# Patient Record
Sex: FEMALE | Race: WHITE | NOT HISPANIC OR LATINO | Employment: PART TIME | URBAN - METROPOLITAN AREA
[De-identification: names, ages, dates, MRNs, and addresses within clinical notes are randomized per-mention and may not be internally consistent; named-entity substitution may affect disease eponyms.]

---

## 2018-03-07 ENCOUNTER — HOSPITAL ENCOUNTER (OUTPATIENT)
Dept: GENERAL RADIOLOGY | Facility: HOSPITAL | Age: 53
Discharge: HOME | End: 2018-03-07
Attending: NURSE PRACTITIONER | Admitting: NURSE PRACTITIONER

## 2019-12-04 RX ORDER — OSELTAMIVIR PHOSPHATE 75 MG/1
75 CAPSULE ORAL DAILY
Qty: 10 CAPSULE | Refills: 0 | Status: SHIPPED | OUTPATIENT
Start: 2019-12-04 | End: 2020-02-26

## 2020-01-08 ENCOUNTER — TELEPHONE (OUTPATIENT)
Dept: FAMILY MEDICINE CLINIC | Facility: CLINIC | Age: 55
End: 2020-01-08

## 2020-01-08 DIAGNOSIS — M85.89 OSTEOPENIA OF MULTIPLE SITES: ICD-10-CM

## 2020-01-08 DIAGNOSIS — E78.2 MIXED HYPERLIPIDEMIA: Primary | ICD-10-CM

## 2020-01-08 DIAGNOSIS — E07.9 THYROID DISORDER: ICD-10-CM

## 2020-01-08 DIAGNOSIS — Z13.220 SCREENING, LIPID: ICD-10-CM

## 2020-01-08 DIAGNOSIS — R31.1 BENIGN ESSENTIAL MICROSCOPIC HEMATURIA: ICD-10-CM

## 2020-01-08 DIAGNOSIS — N13.30 HYDRONEPHROSIS, UNSPECIFIED HYDRONEPHROSIS TYPE: ICD-10-CM

## 2020-01-08 PROBLEM — I82.409 DEEP VEIN THROMBOSIS (DVT) (HCC): Status: ACTIVE | Noted: 2020-01-08

## 2020-01-08 PROBLEM — E78.5 HYPERLIPIDEMIA: Status: ACTIVE | Noted: 2020-01-08

## 2020-01-08 PROBLEM — M85.80 OSTEOPENIA: Status: ACTIVE | Noted: 2020-01-08

## 2020-01-08 PROBLEM — N92.0 MENORRHAGIA: Status: ACTIVE | Noted: 2018-03-02

## 2020-01-08 RX ORDER — BIOTIN 5 MG
TABLET ORAL
COMMUNITY
End: 2021-06-04

## 2020-01-08 RX ORDER — FLUTICASONE PROPIONATE 50 MCG
SPRAY, SUSPENSION (ML) NASAL
COMMUNITY
Start: 2016-04-15 | End: 2020-02-26

## 2020-01-08 RX ORDER — MULTIVITAMIN
CAPSULE ORAL
COMMUNITY
Start: 2014-05-08 | End: 2021-10-15

## 2020-01-08 RX ORDER — CETIRIZINE HYDROCHLORIDE 10 MG/1
TABLET ORAL
COMMUNITY
Start: 2016-04-15 | End: 2020-02-26

## 2020-01-08 NOTE — TELEPHONE ENCOUNTER
Patient has appointment with you on 02/26/2020 for her physical and she is requesting labs prior to her appointment as well as an A1C added to the labs. However she is not diabetic or prediabetic. Glucose was in normal range on 03/07/2018.

## 2020-01-21 LAB
1,25(OH)2D3 SERPL-MCNC: 40.3 PG/ML (ref 19.9–79.3)
ALBUMIN SERPL-MCNC: 4.9 G/DL (ref 3.5–5.5)
ALBUMIN/GLOB SERPL: 1.9 {RATIO} (ref 1.2–2.2)
ALP SERPL-CCNC: 57 IU/L (ref 39–117)
ALT SERPL-CCNC: 18 IU/L (ref 0–32)
AST SERPL-CCNC: 26 IU/L (ref 0–40)
BASOPHILS # BLD AUTO: 0 X10E3/UL (ref 0–0.2)
BASOPHILS NFR BLD AUTO: 0 %
BILIRUB SERPL-MCNC: 0.7 MG/DL (ref 0–1.2)
BUN SERPL-MCNC: 15 MG/DL (ref 6–24)
BUN/CREAT SERPL: 19 (ref 9–23)
CALCIUM SERPL-MCNC: 9.5 MG/DL (ref 8.7–10.2)
CHLORIDE SERPL-SCNC: 100 MMOL/L (ref 96–106)
CHOLEST SERPL-MCNC: 221 MG/DL (ref 100–199)
CO2 SERPL-SCNC: 22 MMOL/L (ref 20–29)
CREAT SERPL-MCNC: 0.77 MG/DL (ref 0.57–1)
EOSINOPHIL # BLD AUTO: 0 X10E3/UL (ref 0–0.4)
EOSINOPHIL NFR BLD AUTO: 0 %
ERYTHROCYTE [DISTWIDTH] IN BLOOD BY AUTOMATED COUNT: 13.2 % (ref 11.7–15.4)
GLOBULIN SER CALC-MCNC: 2.6 G/DL (ref 1.5–4.5)
GLUCOSE SERPL-MCNC: 87 MG/DL (ref 65–99)
HCT VFR BLD AUTO: 39.2 % (ref 34–46.6)
HDLC SERPL-MCNC: 54 MG/DL
HGB BLD-MCNC: 12.9 G/DL (ref 11.1–15.9)
IMM GRANULOCYTES # BLD AUTO: 0 X10E3/UL (ref 0–0.1)
IMM GRANULOCYTES NFR BLD AUTO: 0 %
LDLC SERPL CALC-MCNC: 150 MG/DL (ref 0–99)
LYMPHOCYTES # BLD AUTO: 1.8 X10E3/UL (ref 0.7–3.1)
LYMPHOCYTES NFR BLD AUTO: 41 %
MCH RBC QN AUTO: 31 PG (ref 26.6–33)
MCHC RBC AUTO-ENTMCNC: 32.9 G/DL (ref 31.5–35.7)
MCV RBC AUTO: 94 FL (ref 79–97)
MONOCYTES # BLD AUTO: 0.3 X10E3/UL (ref 0.1–0.9)
MONOCYTES NFR BLD AUTO: 7 %
NEUTROPHILS # BLD AUTO: 2.3 X10E3/UL (ref 1.4–7)
NEUTROPHILS NFR BLD AUTO: 52 %
PLATELET # BLD AUTO: 275 X10E3/UL (ref 150–450)
POTASSIUM SERPL-SCNC: 4.4 MMOL/L (ref 3.5–5.2)
PROT SERPL-MCNC: 7.5 G/DL (ref 6–8.5)
RBC # BLD AUTO: 4.16 X10E6/UL (ref 3.77–5.28)
SODIUM SERPL-SCNC: 140 MMOL/L (ref 134–144)
TRIGL SERPL-MCNC: 85 MG/DL (ref 0–149)
TSH SERPL DL<=0.005 MIU/L-ACNC: 0.88 UIU/ML (ref 0.45–4.5)
VLDLC SERPL CALC-MCNC: 17 MG/DL (ref 5–40)
WBC # BLD AUTO: 4.3 X10E3/UL (ref 3.4–10.8)

## 2020-02-18 DIAGNOSIS — Z12.31 BREAST CANCER SCREENING BY MAMMOGRAM: Primary | ICD-10-CM

## 2020-02-26 ENCOUNTER — OFFICE VISIT (OUTPATIENT)
Dept: FAMILY MEDICINE CLINIC | Facility: CLINIC | Age: 55
End: 2020-02-26

## 2020-02-26 ENCOUNTER — HOSPITAL ENCOUNTER (OUTPATIENT)
Dept: BONE DENSITY | Facility: HOSPITAL | Age: 55
Discharge: HOME | End: 2020-02-26

## 2020-02-26 ENCOUNTER — HOSPITAL ENCOUNTER (OUTPATIENT)
Dept: MAMMOGRAPHY | Facility: HOSPITAL | Age: 55
Discharge: HOME | End: 2020-02-26
Admitting: NURSE PRACTITIONER

## 2020-02-26 DIAGNOSIS — M85.89 OSTEOPENIA OF MULTIPLE SITES: ICD-10-CM

## 2020-02-26 DIAGNOSIS — F51.04 CHRONIC INSOMNIA: ICD-10-CM

## 2020-02-26 DIAGNOSIS — N30.01 ACUTE CYSTITIS WITH HEMATURIA: ICD-10-CM

## 2020-02-26 DIAGNOSIS — M25.532 LEFT WRIST PAIN: ICD-10-CM

## 2020-02-26 DIAGNOSIS — Z12.4 SCREENING FOR CERVICAL CANCER: ICD-10-CM

## 2020-02-26 DIAGNOSIS — M25.511 ACUTE PAIN OF RIGHT SHOULDER: ICD-10-CM

## 2020-02-26 DIAGNOSIS — R00.2 PALPITATIONS: ICD-10-CM

## 2020-02-26 DIAGNOSIS — R31.1 BENIGN ESSENTIAL MICROSCOPIC HEMATURIA: ICD-10-CM

## 2020-02-26 DIAGNOSIS — E78.2 MIXED HYPERLIPIDEMIA: ICD-10-CM

## 2020-02-26 DIAGNOSIS — E04.1 THYROID NODULE: ICD-10-CM

## 2020-02-26 DIAGNOSIS — N95.1 PERIMENOPAUSAL: ICD-10-CM

## 2020-02-26 DIAGNOSIS — Z00.00 ANNUAL PHYSICAL EXAM: Primary | ICD-10-CM

## 2020-02-26 DIAGNOSIS — Z12.11 SCREENING FOR COLON CANCER: ICD-10-CM

## 2020-02-26 DIAGNOSIS — B97.7 HUMAN PAPILLOMA VIRUS (HPV) INFECTION: ICD-10-CM

## 2020-02-26 DIAGNOSIS — I82.5Y9 CHRONIC DEEP VEIN THROMBOSIS (DVT) OF PROXIMAL VEIN OF LOWER EXTREMITY, UNSPECIFIED LATERALITY (HCC): ICD-10-CM

## 2020-02-26 DIAGNOSIS — I87.2 CHRONIC VENOUS INSUFFICIENCY: ICD-10-CM

## 2020-02-26 DIAGNOSIS — J30.9 ALLERGIC RHINITIS, UNSPECIFIED SEASONALITY, UNSPECIFIED TRIGGER: ICD-10-CM

## 2020-02-26 DIAGNOSIS — Z12.31 BREAST CANCER SCREENING BY MAMMOGRAM: ICD-10-CM

## 2020-02-26 PROBLEM — H53.8 BLURRED VISION, LEFT EYE: Status: ACTIVE | Noted: 2020-02-26

## 2020-02-26 PROBLEM — I83.009 CHRONIC CUTANEOUS VENOUS STASIS ULCER (HCC): Status: ACTIVE | Noted: 2020-02-26

## 2020-02-26 PROBLEM — L97.909 CHRONIC CUTANEOUS VENOUS STASIS ULCER: Status: ACTIVE | Noted: 2020-02-26

## 2020-02-26 LAB
BILIRUB BLD-MCNC: NEGATIVE MG/DL
CLARITY, POC: CLEAR
COLOR UR: YELLOW
GLUCOSE UR STRIP-MCNC: NEGATIVE MG/DL
KETONES UR QL: NEGATIVE
LEUKOCYTE EST, POC: NEGATIVE
NITRITE UR-MCNC: NEGATIVE MG/ML
PH UR: 5.5 [PH] (ref 5–8)
PROT UR STRIP-MCNC: NEGATIVE MG/DL
RBC # UR STRIP: ABNORMAL /UL
SP GR UR: 1 (ref 1–1.03)
UROBILINOGEN UR QL: NORMAL

## 2020-02-26 PROCEDURE — 77080 DXA BONE DENSITY AXIAL: CPT

## 2020-02-26 PROCEDURE — 81003 URINALYSIS AUTO W/O SCOPE: CPT | Performed by: NURSE PRACTITIONER

## 2020-02-26 PROCEDURE — 77067 SCR MAMMO BI INCL CAD: CPT

## 2020-02-26 PROCEDURE — 77063 BREAST TOMOSYNTHESIS BI: CPT

## 2020-02-26 PROCEDURE — 99396 PREV VISIT EST AGE 40-64: CPT | Performed by: NURSE PRACTITIONER

## 2020-02-26 NOTE — ASSESSMENT & PLAN NOTE
She declines further evaluation at this time.  Advised to rest.  Return if not improving over the next few weeks.

## 2020-02-26 NOTE — ASSESSMENT & PLAN NOTE
Encouraged weightbearing exercise and increased calcium intake.  Will call with DEXA results and further recommendations.

## 2020-02-26 NOTE — ASSESSMENT & PLAN NOTE
Discussed anticipated course.  If she continues to have menstrual bleeding over the next year we will have her see GYN for further evaluation.

## 2020-02-26 NOTE — PROGRESS NOTES
Chief Complaint   Patient presents with   • Annual Exam        Subjective     Lexi Hannah  has a past medical history of Abnormal inferior vena caval connection, Allergic rhinitis, Blurred vision, left eye (2/26/2020), Chronic cutaneous venous stasis ulcer (CMS/HCC) (2/26/2020), Chronic insomnia (2/26/2020), Chronic venous insufficiency (2/26/2020), Diverticulosis, DVT (deep vein thrombosis) in pregnancy, Hearing loss, conductive, Hematuria, unspecified, High risk human papilloma virus (HPV) infection of cervix, Hydronephrosis, bilateral, Hyperlipidemia, Menorrhagia, Microscopic hematuria, Obstructive nephropathy, Osteopenia, Overweight, Pelvicaliectasis, Perimenopausal (2/26/2020), Right bundle branch block, [place] spotted fever, Thyroid disorder, and Uterine fibroid.    Gynecologic Exam   The patient's pertinent negatives include no genital itching, genital lesions, genital odor, genital rash, pelvic pain or vaginal discharge. The patient is experiencing no pain. She is not pregnant. Associated symptoms include dysuria. Pertinent negatives include no abdominal pain, back pain, chills, constipation, diarrhea, fever, flank pain, frequency, hematuria, nausea, rash, sore throat, urgency or vomiting. She is sexually active. No, her partner does not have an STD. She uses nothing for contraception. Her menstrual history has been irregular.       PHQ-2 Depression Screening  Little interest or pleasure in doing things? 0   Feeling down, depressed, or hopeless? 0   PHQ-2 Total Score 0       No Known Allergies      Current Outpatient Medications:   •  Calcium Carb-Cholecalciferol (CALCIUM + D3) 600-200 MG-UNIT tablet, CALCIUM + D3 600-200 MG-UNIT TABS, Disp: , Rfl:   •  Krill Oil 1000 MG capsule, KRILL OIL 1000 MG CAPS, Disp: , Rfl:   •  Multiple Vitamin (MULTIVITAMIN) capsule, MULTIVITAMINS CAPS, Disp: , Rfl:   •  rivaroxaban (XARELTO) 20 MG tablet, Take 1 tablet by mouth Daily With Dinner., Disp: 90 tablet,  Rfl: 3    Past Medical History:   Diagnosis Date   • Abnormal inferior vena caval connection     Inferior Vena Cava Abnormality/interruption   • Allergic rhinitis    • Blurred vision, left eye 2/26/2020   • Chronic cutaneous venous stasis ulcer (CMS/HCC) 2/26/2020   • Chronic insomnia 2/26/2020   • Chronic venous insufficiency 2/26/2020   • Diverticulosis    • DVT (deep vein thrombosis) in pregnancy    • Hearing loss, conductive    • Hematuria, unspecified    • High risk human papilloma virus (HPV) infection of cervix     HPV+ (neg 16/18 (2/2016) 3/2018 Co-testing negative   • Hydronephrosis, bilateral    • Hyperlipidemia    • Menorrhagia    • Microscopic hematuria     Urology evaluated   • Obstructive nephropathy    • Osteopenia    • Overweight    • Pelvicaliectasis    • Perimenopausal 2/26/2020   • Right bundle branch block    • [place] spotted fever    • Thyroid disorder     Thyroid Cyst/Nodule   • Uterine fibroid        History reviewed. No pertinent surgical history.    Social History     Socioeconomic History   • Marital status:      Spouse name: Not on file   • Number of children: Not on file   • Years of education: Not on file   • Highest education level: Not on file   Tobacco Use   • Smoking status: Never Smoker   • Smokeless tobacco: Never Used   Substance and Sexual Activity   • Alcohol use: Not Currently   • Drug use: Never       Family History   Problem Relation Age of Onset   • Diabetes Mother    • Hyperlipidemia Mother    • Breast cancer Mother 63   • Melanoma Father    • Leukemia Father    • Clotting disorder Father    • Clotting disorder Sister    • Diabetes Maternal Grandmother    • Clotting disorder Daughter    • Clotting disorder Son        Family history, surgical history, past medical history, Allergies and med's reviewed with patient today and updated in [facility] EMR.     ROS:  Review of Systems   Constitutional: Negative for appetite change, chills, diaphoresis, fatigue, fever,  "unexpected weight gain and unexpected weight loss.   HENT: Negative for congestion, ear discharge, ear pain, hearing loss, mouth sores, nosebleeds, postnasal drip, rhinorrhea, sinus pressure, sneezing, sore throat, swollen glands and trouble swallowing.    Eyes: Positive for blurred vision. Negative for double vision, pain, discharge, redness and itching.        Left eye - chronic. Followed [facility]   Respiratory: Negative for apnea, cough, choking, shortness of breath, wheezing and stridor.    Cardiovascular: Positive for palpitations and leg swelling. Negative for chest pain.        For several years has had a fast flutter in chest and then in neck. Lasts a few minutes. Occasionally feels \"weird\" in her head, not really dizzy, almost flush. Happens rarely.  Occasional strong heart beat - notices it more in yoga when she has her legs up in the air.   Chronic RLE edema, occasional LLE edema.   Gastrointestinal: Negative for abdominal distention, abdominal pain, anal bleeding, blood in stool, constipation, diarrhea, nausea, rectal pain, vomiting, GERD and indigestion.   Endocrine: Negative for cold intolerance, heat intolerance, polydipsia, polyphagia and polyuria.        Mild hot flashes   Genitourinary: Positive for dysuria and menstrual problem. Negative for amenorrhea, breast discharge, breast lump, breast pain, difficulty urinating, flank pain, frequency, genital sores, hematuria, pelvic pain, urgency, urinary incontinence, vaginal bleeding, vaginal discharge and vaginal pain.        Irregular menses, skipped for 3 months, then had one.   On Bactrim DS for UTI.   Musculoskeletal: Positive for arthralgias. Negative for back pain, gait problem, joint swelling, myalgias, neck pain and neck stiffness.        Right shoulder, left wrist.    Skin: Negative for color change, rash and skin lesions.        RLE venous ulcer healed   Neurological: Negative for dizziness, tremors, seizures, syncope, speech " "difficulty, weakness, light-headedness, numbness, headache, memory problem and confusion.   Hematological: Negative for adenopathy. Bruises/bleeds easily.   Psychiatric/Behavioral: Positive for sleep disturbance. Negative for self-injury, suicidal ideas, depressed mood and stress. The patient is not nervous/anxious.         Chronic       OBJECTIVE:  Vitals:    02/26/20 0853 02/26/20 0957   BP: 147/79 134/84   BP Location: Right arm    Patient Position: Sitting    Cuff Size: Adult    Pulse: 86    Resp: 14    Temp: 97.5 °F (36.4 °C)    TempSrc: Oral    SpO2: 97%    Weight: 93.4 kg (206 lb)    Height: 156.2 cm (61.5\") 177.8 cm (70\")     Body mass index is 29.56 kg/m².  Patient's last menstrual period was 02/15/2020 (exact date).    Physical Exam   Constitutional: She is oriented to person, place, and time. She appears well-developed and well-nourished. She is active. No distress.   HENT:   Head: Normocephalic and atraumatic.   Right Ear: Tympanic membrane, external ear and ear canal normal. Tympanic membrane is not injected, not erythematous, not retracted and not bulging.   Left Ear: Tympanic membrane, external ear and ear canal normal. Tympanic membrane is not injected, not erythematous, not retracted and not bulging.   Nose: Nose normal. No mucosal edema or rhinorrhea. Right sinus exhibits no maxillary sinus tenderness and no frontal sinus tenderness. Left sinus exhibits no maxillary sinus tenderness and no frontal sinus tenderness.   Mouth/Throat: Uvula is midline, oropharynx is clear and moist and mucous membranes are normal. No oral lesions. No oropharyngeal exudate, posterior oropharyngeal edema or posterior oropharyngeal erythema.   Eyes: Pupils are equal, round, and reactive to light. Conjunctivae, EOM and lids are normal. Right eye exhibits no discharge. Left eye exhibits no discharge.   Neck: Normal range of motion. Neck supple. No JVD present. Carotid bruit is not present. No tracheal deviation present. No " thyroid mass and no thyromegaly present.   Cardiovascular: Normal rate, regular rhythm, normal heart sounds and intact distal pulses. Exam reveals no gallop and no friction rub.   No murmur heard.  Pulmonary/Chest: Effort normal and breath sounds normal. No respiratory distress. She has no wheezes. She has no rales. Right breast exhibits no inverted nipple, no mass, no nipple discharge, no skin change and no tenderness. Left breast exhibits no inverted nipple, no mass, no nipple discharge, no skin change and no tenderness. Breasts are symmetrical.   Abdominal: Soft. Bowel sounds are normal. She exhibits no distension and no mass. There is no hepatosplenomegaly. There is no tenderness. No hernia.   Genitourinary: Rectum normal, vagina normal and uterus normal. Pelvic exam was performed with patient supine. There is no rash, tenderness or lesion on the right labia. There is no rash, tenderness or lesion on the left labia. Uterus is not tender. Cervix exhibits no motion tenderness, no discharge and no friability. Right adnexum displays no mass, no tenderness and no fullness. Left adnexum displays no mass, no tenderness and no fullness. No erythema or tenderness in the vagina. No vaginal discharge found.   Musculoskeletal: Normal range of motion. She exhibits no edema or deformity.   Lymphadenopathy:     She has no cervical adenopathy.     She has no axillary adenopathy. No inguinal adenopathy noted on the right or left side.   Neurological: She is alert and oriented to person, place, and time. She has normal strength and normal reflexes. No cranial nerve deficit or sensory deficit. Gait normal.   Skin: Skin is warm, dry and intact. No lesion and no rash noted.   Psychiatric: She has a normal mood and affect. Her speech is normal and behavior is normal. Judgment and thought content normal. Cognition and memory are normal.   Vitals reviewed.      Office Visit on 02/26/2020   Component Date Value Ref Range Status   •  Color 02/26/2020 Yellow  Yellow, Straw, Dark Yellow, [place] Final   • Clarity, UA 02/26/2020 Clear  Clear Final   • Specific Gravity  02/26/2020 1.005  1.005 - 1.030 Final   • pH, Urine 02/26/2020 5.5  5.0 - 8.0 Final   • Leukocytes 02/26/2020 Negative  Negative Final   • Nitrite, UA 02/26/2020 Negative  Negative Final   • Protein, POC 02/26/2020 Negative  Negative mg/dL Final   • Glucose, UA 02/26/2020 Negative  Negative, 1000 mg/dL (3+) mg/dL Final   • Ketones, UA 02/26/2020 Negative  Negative Final   • Urobilinogen, UA 02/26/2020 Normal  Normal Final   • Bilirubin 02/26/2020 Negative  Negative Final   • Blood, UA 02/26/2020 Trace* Negative Final   • Diagnosis 02/26/2020 Comment   Final    NEGATIVE FOR INTRAEPITHELIAL LESION OR MALIGNANCY.   • Specimen adequacy: 02/26/2020 Comment   Final    Comment: Satisfactory for evaluation.  Endocervical and/or squamous metaplastic  cells (endocervical component) are present.     • Clinician Provided ICD-10: 02/26/2020 Comment   Final    Comment: B97.7  Z12.4     • Performed by: 02/26/2020 Comment   Final    Lisa Morocho Cytotechnologist (ASCP)   • . 02/26/2020 .   Final   • Note: 02/26/2020 Comment   Final    Comment: The Pap smear is a screening test designed to aid in the detection of  premalignant and malignant conditions of the uterine cervix.  It is not a  diagnostic procedure and should not be used as the sole means of detecting  cervical cancer.  Both false-positive and false-negative reports do occur.     • Method: 02/26/2020 Comment   Final    Comment: This liquid based ThinPrep(R) pap test was screened with the  use of an image guided system.     • HPV, high-risk 02/26/2020 Negative  Negative Final    Comment: This nucleic acid amplification high-risk HPV test detects thirteen  high-risk types (16,18,31,33,35,39,45,51,52,56,58,59,68) without  differentiation.         ASSESSMENT/ PLAN:    Diagnoses and all orders for this visit:    1. Annual physical exam  (Primary)  Comments:  Discussed age-appropriate health maintenance.  Orders:  -     POC Urinalysis Dipstick, Automated    2. Palpitations  Assessment & Plan:  This is been a chronic issue, that may be getting a little worse.    She had a Holter monitor in 2016 that showed a baseline normal sinus rhythm, with a maximum rate of 160.  She had isolated supraventricular ectopic complexes and several PVCs.  She also had an echocardiogram in 2016 that showed an ejection fraction of 55 to 60% and normal valvular structure.  She will likely need a 30-day monitor to further evaluate this.   Will have her see Cardiology to discuss further work-up.     Orders:  -     Cancel: Ambulatory Referral to Cardiology  -     Ambulatory Referral to Cardiology    3. Osteopenia of multiple sites  Assessment & Plan:  Encouraged weightbearing exercise and increased calcium intake.  Will call with DEXA results and further recommendations.    Orders:  -     DEXA Bone Density Axial    4. Human papilloma virus (HPV) infection  Assessment & Plan:  Last Pap and HPV testing were negative.  She requests repeat of HPV testing today.    Orders:  -     Pap IG, HPV-hr    5. Acute cystitis with hematuria    6. Chronic venous insufficiency  Assessment & Plan:  Continue use of compression stockings.  Elevate legs as much as possible.      7. Chronic deep vein thrombosis (DVT) of proximal vein of lower extremity, unspecified laterality (CMS/[facility])  Assessment & Plan:  Continue anticoagulation therapy.      8. Mixed hyperlipidemia  Assessment & Plan:  Reviewed lipid results and 10-year ASCVD risk.  Encouraged healthy diet, regular physical activity, weight loss.      9. Allergic rhinitis, unspecified seasonality, unspecified trigger  Assessment & Plan:  Mild seasonal allergies.  Controlled with as needed use of oral antihistamine.      10. Thyroid nodule  Assessment & Plan:  Benign-appearing cystic nodules on 2011 ultrasound.  She declines further evaluation, I  think this is reasonable.      11. Acute pain of right shoulder  Assessment & Plan:  She declines further evaluation at this time.  Advised to rest.  Return if not improving over the next few weeks.      12. Left wrist pain  Assessment & Plan:  She declines further evaluation.  She will return if pain does not improve over the next few weeks.      13. Perimenopausal  Assessment & Plan:  Discussed anticipated course.  If she continues to have menstrual bleeding over the next year we will have her see GYN for further evaluation.      14. Benign essential microscopic hematuria  Assessment & Plan:  Chronic.  She has seen neurology for this in the past and had a negative work-up.      15. Chronic insomnia  Assessment & Plan:  She declines intervention.      16. Screening for cervical cancer  -     Pap IG, HPV-hr    17. Screening for colon cancer  Comments:  Next colonoscopy due in April 2026.    Other orders  -     rivaroxaban (XARELTO) 20 MG tablet; Take 1 tablet by mouth Daily With Dinner.  Dispense: 90 tablet; Refill: 3      Orders Placed Today:     New Medications Ordered This Visit   Medications   • rivaroxaban (XARELTO) 20 MG tablet     Sig: Take 1 tablet by mouth Daily With Dinner.     Dispense:  90 tablet     Refill:  3        Management Plan:     An After Visit Summary was printed and given to the patient at discharge.    Follow-up: No follow-ups on file.    PATRICIA Santoro 3/4/2020 9:01 AM  This note was electronically signed.

## 2020-02-26 NOTE — PATIENT INSTRUCTIONS
Calcium Content in Foods  Calcium is the most abundant mineral in your body. Most of your body's calcium supply is stored in your bones and teeth. Calcium helps many parts of the body function normally, including:  · Blood and blood vessels.  · Nerves.  · Hormones.  · Muscles.  · Bones and teeth.  When your calcium stores are low, you may be at risk for low bone mass, bone loss, and broken bones (fractures). When you get enough calcium, it helps to support strong bones and teeth throughout your life.  Calcium is especially important for:  · Children during growth spurts.  · Girls during adolescence.  · Women who are pregnant or breastfeeding.  · Women after their menstrual cycle stops (postmenopause).  · Women whose menstrual cycle has stopped due to anorexia nervosa or regular intense exercise.  · People who cannot eat or digest dairy products.  · Vegans.  What are tips for getting more calcium?  General information  · Try to get most of your calcium from food. Eat foods that are high in calcium.  · Some people may benefit from taking calcium supplements. Check with your health care provider or diet and nutrition specialist (dietitian) before starting any calcium supplements. Calcium supplements may interact with certain medicines. Too much calcium may cause other health problems, like constipation and kidney stones.  · For the body to absorb calcium, it needs vitamin D. Sources of vitamin D include:  ? Skin exposure to direct sunlight.  ? Foods, such as egg yolks, liver, saltwater fish, and fortified milk.  ? Vitamin D supplements. Check with your health care provider or dietitian before starting any vitamin D supplements.  What foods are high in calcium?    High-calcium foods are those that contain more than 100 milligrams (mg) of calcium per serving.  Fruits  · Fortified orange or other fruit juice, 300 mg per 8 oz serving.  Vegetables  · Rah greens, 360 mg per 8 oz serving.  · Kale, 180 mg per 8 oz  serving.  · Bok samantha, 160 mg per 8 oz serving.  Grains  · Fortified ready-to-eat cereals, 100-1,000 mg per 8 oz serving.  · Fortified frozen waffles, 200 mg in two waffles.  Meats and other proteins  · Sardines, canned with bones, 325 mg per 3 oz serving.  · [place], canned with bones, 180 mg per 3 oz serving.  · Canned shrimp, 125 mg per 3 oz serving.  · Baked beans, 160 mg per 4 oz serving.  Dairy  · Yogurt, plain, low-fat, 310 mg per 6 oz serving.  · Milk, 300 mg per 8 oz serving.  · American cheese, 195 mg per 1 oz serving.  · Cheddar cheese, 205 mg per 1 oz serving.  · Cottage cheese 2%, 105 mg per 4 oz serving.  · Fortified soy, rice, or almond milk, 300 mg per 8 oz serving.  The items listed above may not be a complete list of foods high in calcium. Actual amounts of calcium may be different depending on processing. Contact a dietitian for more information.  What foods are lower in calcium?  Foods lower in calcium are those that contain 50 mg of calcium or less per serving.  Fruits  · Apple, about 6 mg in one apple.  · Banana, about 12 mg in one banana.  Vegetables  · Lettuce, 19 mg per 2 oz serving.  · Tomato, about 11 mg in one tomato.  Grains  · Rice, 4 mg per 6 oz serving.  · Boiled potatoes, 14 mg per 8 oz serving.  · White bread, 6 mg in one slice.  Meats and other proteins  · Egg, 27 mg per 2 oz serving.  · Red meat, 7 mg per 4 oz serving.  · Chicken, 17 mg per 4 oz serving.  · Fish, cod or trout, 20 mg per 4 oz serving.  The items listed above may not be a complete list of foods lower in calcium. Actual amounts of calcium may be different depending on processing. Contact a dietitian for more information.  Summary  · Calcium is an important mineral in the body because it affects many functions. Getting enough calcium helps support strong bones and teeth throughout your life.  · Try to get most of your calcium from food.  · Calcium supplements may interact with certain medicines. Check with your health  care provider before starting any calcium supplements.  This information is not intended to replace advice given to you by your health care provider. Make sure you discuss any questions you have with your health care provider.  Document Released: 08/01/2005 Document Revised: 12/11/2018 Document Reviewed: 12/11/2018  Elsevier Interactive Patient Education © 2020 [employer]

## 2020-02-26 NOTE — ASSESSMENT & PLAN NOTE
This is been a chronic issue, that may be getting a little worse.    She had a Holter monitor in 2016 that showed a baseline normal sinus rhythm, with a maximum rate of 160.  She had isolated supraventricular ectopic complexes and several PVCs.  She also had an echocardiogram in 2016 that showed an ejection fraction of 55 to 60% and normal valvular structure.  She will likely need a 30-day monitor to further evaluate this.   Will have her see Cardiology to discuss further work-up.

## 2020-02-26 NOTE — ASSESSMENT & PLAN NOTE
Reviewed lipid results and 10-year ASCVD risk.  Encouraged healthy diet, regular physical activity, weight loss.

## 2020-02-26 NOTE — ASSESSMENT & PLAN NOTE
Benign-appearing cystic nodules on 2011 ultrasound.  She declines further evaluation, I think this is reasonable.

## 2020-02-26 NOTE — ASSESSMENT & PLAN NOTE
She declines further evaluation.  She will return if pain does not improve over the next few weeks.

## 2020-02-26 NOTE — ASSESSMENT & PLAN NOTE
Currently healed.  She does well managing this with the use of compression stockings and topical OTC treatments.

## 2020-02-28 LAB
CYTOLOGIST CVX/VAG CYTO: NORMAL
CYTOLOGY CVX/VAG DOC CYTO: NORMAL
CYTOLOGY CVX/VAG DOC THIN PREP: NORMAL
DX ICD CODE: NORMAL
HIV 1 & 2 AB SER-IMP: NORMAL
HPV I/H RISK 1 DNA CVX QL PROBE+SIG AMP: NEGATIVE
OTHER STN SPEC: NORMAL
STAT OF ADQ CVX/VAG CYTO-IMP: NORMAL

## 2020-03-04 VITALS
RESPIRATION RATE: 14 BRPM | WEIGHT: 206 LBS | BODY MASS INDEX: 29.49 KG/M2 | HEART RATE: 86 BPM | SYSTOLIC BLOOD PRESSURE: 134 MMHG | TEMPERATURE: 97.5 F | OXYGEN SATURATION: 97 % | HEIGHT: 70 IN | DIASTOLIC BLOOD PRESSURE: 84 MMHG

## 2020-03-15 ENCOUNTER — TELEPHONE (OUTPATIENT)
Dept: FAMILY MEDICINE CLINIC | Facility: CLINIC | Age: 55
End: 2020-03-15

## 2020-03-15 RX ORDER — OSELTAMIVIR PHOSPHATE 75 MG/1
75 CAPSULE ORAL 2 TIMES DAILY
Qty: 10 CAPSULE | Refills: 0 | Status: SHIPPED | OUTPATIENT
Start: 2020-03-15 | End: 2020-03-20

## 2020-04-06 ENCOUNTER — TELEPHONE (OUTPATIENT)
Dept: FAMILY MEDICINE CLINIC | Facility: CLINIC | Age: 55
End: 2020-04-06

## 2020-04-06 RX ORDER — DOXYCYCLINE 100 MG/1
100 CAPSULE ORAL 2 TIMES DAILY
Qty: 14 CAPSULE | Refills: 0 | Status: SHIPPED | OUTPATIENT
Start: 2020-04-06 | End: 2020-04-13

## 2020-04-06 NOTE — TELEPHONE ENCOUNTER
Patient found a tick attached. Could have been there x 3 days. She has a hx of RMSF.  Requesting Doxycyline.

## 2021-03-12 RX ORDER — RIVAROXABAN 20 MG/1
TABLET, FILM COATED ORAL
Qty: 90 TABLET | Refills: 0 | Status: SHIPPED | OUTPATIENT
Start: 2021-03-12 | End: 2021-03-16

## 2021-03-12 NOTE — TELEPHONE ENCOUNTER
Let patient know that I sent in refill on her Xarelto, but she is due for her annual check-up (last one was February 2020). She needs to get this scheduled.

## 2021-03-16 RX ORDER — RIVAROXABAN 20 MG/1
TABLET, FILM COATED ORAL
Qty: 90 TABLET | Refills: 0 | Status: SHIPPED | OUTPATIENT
Start: 2021-03-16 | End: 2021-06-07

## 2021-04-22 ENCOUNTER — OFFICE VISIT (OUTPATIENT)
Dept: FAMILY MEDICINE CLINIC | Facility: CLINIC | Age: 56
End: 2021-04-22

## 2021-04-22 VITALS
WEIGHT: 208 LBS | OXYGEN SATURATION: 97 % | DIASTOLIC BLOOD PRESSURE: 76 MMHG | HEART RATE: 93 BPM | SYSTOLIC BLOOD PRESSURE: 112 MMHG | RESPIRATION RATE: 16 BRPM | BODY MASS INDEX: 29.78 KG/M2 | TEMPERATURE: 97.3 F | HEIGHT: 70 IN

## 2021-04-22 DIAGNOSIS — R06.09 DYSPNEA ON EXERTION: ICD-10-CM

## 2021-04-22 DIAGNOSIS — B97.7 HUMAN PAPILLOMA VIRUS (HPV) INFECTION: ICD-10-CM

## 2021-04-22 DIAGNOSIS — I87.2 CHRONIC VENOUS INSUFFICIENCY: ICD-10-CM

## 2021-04-22 DIAGNOSIS — Z12.31 BREAST CANCER SCREENING BY MAMMOGRAM: ICD-10-CM

## 2021-04-22 DIAGNOSIS — F51.04 CHRONIC INSOMNIA: ICD-10-CM

## 2021-04-22 DIAGNOSIS — I82.5Y9 CHRONIC DEEP VEIN THROMBOSIS (DVT) OF PROXIMAL VEIN OF LOWER EXTREMITY, UNSPECIFIED LATERALITY (HCC): ICD-10-CM

## 2021-04-22 DIAGNOSIS — R31.1 BENIGN ESSENTIAL MICROSCOPIC HEMATURIA: ICD-10-CM

## 2021-04-22 DIAGNOSIS — Z12.11 SCREENING FOR COLON CANCER: ICD-10-CM

## 2021-04-22 DIAGNOSIS — I83.009 CHRONIC CUTANEOUS VENOUS STASIS ULCER (HCC): ICD-10-CM

## 2021-04-22 DIAGNOSIS — Z12.4 SCREENING FOR CERVICAL CANCER: ICD-10-CM

## 2021-04-22 DIAGNOSIS — J30.9 ALLERGIC RHINITIS, UNSPECIFIED SEASONALITY, UNSPECIFIED TRIGGER: ICD-10-CM

## 2021-04-22 DIAGNOSIS — E78.2 MIXED HYPERLIPIDEMIA: ICD-10-CM

## 2021-04-22 DIAGNOSIS — Z00.00 PREVENTATIVE HEALTH CARE: Primary | ICD-10-CM

## 2021-04-22 DIAGNOSIS — R00.2 PALPITATIONS: ICD-10-CM

## 2021-04-22 DIAGNOSIS — I45.10 RBBB: ICD-10-CM

## 2021-04-22 DIAGNOSIS — M85.89 OSTEOPENIA OF MULTIPLE SITES: ICD-10-CM

## 2021-04-22 DIAGNOSIS — M25.50 ARTHRALGIA, UNSPECIFIED JOINT: ICD-10-CM

## 2021-04-22 DIAGNOSIS — L97.909 CHRONIC CUTANEOUS VENOUS STASIS ULCER (HCC): ICD-10-CM

## 2021-04-22 PROBLEM — N95.1 PERIMENOPAUSAL: Status: RESOLVED | Noted: 2020-02-26 | Resolved: 2021-04-22

## 2021-04-22 PROBLEM — N92.0 MENORRHAGIA: Status: RESOLVED | Noted: 2018-03-02 | Resolved: 2021-04-22

## 2021-04-22 PROBLEM — M25.532 LEFT WRIST PAIN: Status: RESOLVED | Noted: 2020-02-26 | Resolved: 2021-04-22

## 2021-04-22 PROBLEM — M25.511 ACUTE PAIN OF RIGHT SHOULDER: Status: RESOLVED | Noted: 2020-02-26 | Resolved: 2021-04-22

## 2021-04-22 LAB
BILIRUB BLD-MCNC: NEGATIVE MG/DL
CLARITY, POC: CLEAR
COLOR UR: YELLOW
GLUCOSE UR STRIP-MCNC: NEGATIVE MG/DL
KETONES UR QL: ABNORMAL
LEUKOCYTE EST, POC: NEGATIVE
NITRITE UR-MCNC: NEGATIVE MG/ML
PH UR: 6 [PH] (ref 5–8)
PROT UR STRIP-MCNC: NEGATIVE MG/DL
RBC # UR STRIP: ABNORMAL /UL
SP GR UR: 1.02 (ref 1–1.03)
UROBILINOGEN UR QL: NORMAL

## 2021-04-22 PROCEDURE — 93000 ELECTROCARDIOGRAM COMPLETE: CPT | Performed by: NURSE PRACTITIONER

## 2021-04-22 PROCEDURE — 99396 PREV VISIT EST AGE 40-64: CPT | Performed by: NURSE PRACTITIONER

## 2021-04-22 PROCEDURE — 81003 URINALYSIS AUTO W/O SCOPE: CPT | Performed by: NURSE PRACTITIONER

## 2021-04-22 NOTE — ASSESSMENT & PLAN NOTE
Will call with lipid results and further recommendations.     The 10-year ASCVD risk score (Oma OCHOA Jr., et al., 2013) is: 2.1%    Values used to calculate the score:      Age: 55 years      Sex: Female      Is Non- : No      Diabetic: No      Tobacco smoker: No      Systolic Blood Pressure: 112 mmHg      Is BP treated: No      HDL Cholesterol: 54 mg/dL      Total Cholesterol: 221 mg/dL

## 2021-04-22 NOTE — PROGRESS NOTES
Chief Complaint  Annual Exam    Subjective          History of Present Illness  Patient presents for her annual wellness/preventive visit. See review of systems below for discussion of problems she has been having.         Current Outpatient Medications:   •  Calcium Carb-Cholecalciferol (CALCIUM + D3) 600-200 MG-UNIT tablet, CALCIUM + D3 600-200 MG-UNIT TABS, Disp: , Rfl:   •  Krill Oil 1000 MG capsule, KRILL OIL 1000 MG CAPS, Disp: , Rfl:   •  Multiple Vitamin (MULTIVITAMIN) capsule, MULTIVITAMINS CAPS, Disp: , Rfl:   •  Xarelto 20 MG tablet, TAKE ONE TABLET BY MOUTH ONCE DAILY WITH DINNER, Disp: 90 tablet, Rfl: 0     Review of Systems   Constitutional: Negative for appetite change, chills, diaphoresis, fatigue, fever, unexpected weight gain and unexpected weight loss.   HENT: Negative for congestion, ear discharge, ear pain, hearing loss, mouth sores, nosebleeds, postnasal drip, rhinorrhea, sinus pressure, sneezing, sore throat, swollen glands and trouble swallowing.    Eyes: Positive for blurred vision. Negative for double vision, pain, discharge, redness and itching.        Left eye - chronic. Followed [facility]   Respiratory: Positive for shortness of breath. Negative for apnea, cough, choking, wheezing and stridor.         Dyspnea with moderate exertion (hiking) for the last year.    Cardiovascular: Positive for palpitations and leg swelling. Negative for chest pain.        Chronic RLE edema, occasional LLE edema.  Palpitations are better, but still present. Sometimes short period of fast rate. Sometimes isolated skipped beat sensation.    Gastrointestinal: Negative for abdominal distention, abdominal pain, anal bleeding, blood in stool, constipation, diarrhea, nausea, rectal pain, vomiting, GERD and indigestion.   Endocrine: Negative for cold intolerance, heat intolerance, polydipsia, polyphagia and polyuria.        Mild hot flashes   Genitourinary: Negative for breast discharge, breast lump,  "breast pain, difficulty urinating, dysuria, flank pain, frequency, genital sores, hematuria, pelvic pain, urgency, urinary incontinence, vaginal bleeding, vaginal discharge and vaginal pain.   Musculoskeletal: Positive for arthralgias. Negative for back pain, gait problem, joint swelling, myalgias, neck pain and neck stiffness.        Fingers, knees, shoulders. Swelling in fingers - 3rd right finger worse. Left 5th finger PIP joint. Had pain and swelling of right 3rd, 4th, 5th fingers after her COVID vaccines.    Skin: Negative for color change, rash and skin lesions.   Neurological: Negative for dizziness, tremors, seizures, syncope, speech difficulty, weakness, light-headedness, numbness, headache, memory problem and confusion.   Hematological: Negative for adenopathy. Does not bruise/bleed easily.   Psychiatric/Behavioral: Positive for sleep disturbance. Negative for self-injury, suicidal ideas, depressed mood and stress. The patient is not nervous/anxious.         Chronic        Objective   Vital Signs:   Vitals:    04/22/21 1502 04/22/21 1548   BP: 143/79 112/76   BP Location: Right arm    Patient Position: Sitting    Cuff Size: Large Adult    Pulse: 93    Resp: 16    Temp: 97.3 °F (36.3 °C)    TempSrc: Infrared    SpO2: 97%    Weight: 94.3 kg (208 lb)    Height: 177.8 cm (70\")      Body mass index is 29.84 kg/m².  Patient's last menstrual period was 10/12/2020 (approximate).     PHQ-2 Depression Screening  Little interest or pleasure in doing things? 0   Feeling down, depressed, or hopeless? 0   PHQ-2 Total Score 0     Physical Exam  Vitals reviewed.   Constitutional:       General: She is not in acute distress.     Appearance: She is well-developed.   HENT:      Head: Normocephalic and atraumatic.      Right Ear: Tympanic membrane, ear canal and external ear normal. Tympanic membrane is not injected, erythematous, retracted or bulging.      Left Ear: Tympanic membrane, ear canal and external ear normal. " Tympanic membrane is not injected, erythematous, retracted or bulging.      Nose: Nose normal. No mucosal edema or rhinorrhea.      Right Sinus: No maxillary sinus tenderness or frontal sinus tenderness.      Left Sinus: No maxillary sinus tenderness or frontal sinus tenderness.      Mouth/Throat:      Mouth: No oral lesions.      Pharynx: Uvula midline. No oropharyngeal exudate or posterior oropharyngeal erythema.   Eyes:      General: Lids are normal.         Right eye: No discharge.         Left eye: No discharge.      Conjunctiva/sclera: Conjunctivae normal.      Pupils: Pupils are equal, round, and reactive to light.   Neck:      Thyroid: No thyroid mass or thyromegaly.      Vascular: No carotid bruit or JVD.      Trachea: No tracheal deviation.   Cardiovascular:      Rate and Rhythm: Normal rate and regular rhythm.      Heart sounds: Normal heart sounds. No murmur heard.   No friction rub. No gallop.    Pulmonary:      Effort: Pulmonary effort is normal. No respiratory distress.      Breath sounds: Normal breath sounds. No wheezing or rales.   Chest:      Breasts: Breasts are symmetrical.         Right: No inverted nipple, mass, nipple discharge, skin change or tenderness.         Left: No inverted nipple, mass, nipple discharge, skin change or tenderness.   Abdominal:      General: Bowel sounds are normal. There is no distension.      Palpations: Abdomen is soft. There is no mass.      Tenderness: There is no abdominal tenderness.      Hernia: No hernia is present.   Genitourinary:     Exam position: Supine.      Labia:         Right: No rash, tenderness or lesion.         Left: No rash, tenderness or lesion.       Vagina: Normal. No vaginal discharge, erythema or tenderness.      Cervix: No cervical motion tenderness, discharge or friability.      Uterus: Not tender.       Adnexa:         Right: No mass, tenderness or fullness.          Left: No mass, tenderness or fullness.        Rectum: Normal.    Musculoskeletal:         General: No deformity. Normal range of motion.      Cervical back: Normal range of motion and neck supple.   Lymphadenopathy:      Cervical: No cervical adenopathy.      Lower Body: No right inguinal adenopathy. No left inguinal adenopathy.   Skin:     General: Skin is warm and dry.      Findings: No lesion or rash.   Neurological:      Mental Status: She is alert and oriented to person, place, and time.      Cranial Nerves: No cranial nerve deficit.      Sensory: No sensory deficit.      Gait: Gait normal.      Deep Tendon Reflexes: Reflexes are normal and symmetric.   Psychiatric:         Speech: Speech normal.         Behavior: Behavior normal.         Thought Content: Thought content normal.         Judgment: Judgment normal.          Result Review :   The following data was reviewed by: PATRICIA Santoro on 04/22/2021:  Lab Results   Component Value Date    CHOL 198 03/07/2018    CHLPL 221 (H) 01/16/2020    TRIG 85 01/16/2020    HDL 54 01/16/2020     (H) 01/16/2020     Lab Results   Component Value Date    WBC 4.3 01/16/2020    HGB 12.9 01/16/2020    HCT 39.2 01/16/2020    MCV 94 01/16/2020     01/16/2020     Lab Results   Component Value Date    GLUCOSE 99 03/07/2018    BUN 15 01/16/2020    CREATININE 0.77 01/16/2020    EGFRIFNONA 87 01/16/2020    EGFRIFAFRI 100 01/16/2020    BCR 19 01/16/2020    K 4.4 01/16/2020    CO2 22 01/16/2020    CALCIUM 9.5 01/16/2020    PROTENTOTREF 7.5 01/16/2020    ALBUMIN 4.9 01/16/2020    LABIL2 1.9 01/16/2020    AST 26 01/16/2020    ALT 18 01/16/2020     Lab Results   Component Value Date    TSH 0.875 01/16/2020     Office Visit on 04/22/2021   Component Date Value Ref Range Status   • Color 04/22/2021 Yellow  Yellow, Straw, Dark Yellow, [place] Final   • Clarity, UA 04/22/2021 Clear  Clear Final   • Specific Gravity  04/22/2021 1.025  1.005 - 1.030 Final   • pH, Urine 04/22/2021 6.0  5.0 - 8.0 Final   • Leukocytes 04/22/2021 Negative   Negative Final   • Nitrite, UA 04/22/2021 Negative  Negative Final   • Protein, POC 04/22/2021 Negative  Negative mg/dL Final   • Glucose, UA 04/22/2021 Negative  Negative, 1000 mg/dL (3+) mg/dL Final   • Ketones, UA 04/22/2021 Trace* Negative Final   • Urobilinogen, UA 04/22/2021 Normal  Normal Final   • Bilirubin 04/22/2021 Negative  Negative Final   • Blood, UA 04/22/2021 Trace* Negative Final          ECG 12 Lead    Date/Time: 4/22/2021 4:55 PM  Performed by: Destiny Rowley APRN  Authorized by: Destiny Rowley APRN   Comparison: compared with previous ECG from 2/16/2016  Similar to previous ECG  Rhythm: sinus rhythm  Ectopy: infrequent PVCs  Rate: normal  BPM: 93  Conduction: right bundle branch block  QRS axis: normal    Clinical impression: abnormal EKG                Assessment and Plan    Diagnoses and all orders for this visit:    1. Preventative health care (Primary)  Comments:  Discussed age appropriate health maintenance.  Orders:  -     POC Urinalysis Dipstick, Automated    2. Palpitations  Assessment & Plan:  This is a chronic issue.    She had a Holter monitor in 2016 that showed a baseline normal sinus rhythm, with a maximum rate of 160.  She had isolated supraventricular ectopic complexes and several PVCs.  She also had an echocardiogram in 2016 that showed an ejection fraction of 55 to 60% and normal valvular structure.  Will have her see Cardiology to discuss further work-up.     Orders:  -     Ambulatory Referral to Cardiology  -     ECG 12 Lead    3. Dyspnea on exertion  Assessment & Plan:  Will call with test results and will have her see Cardiology for further evaluation.     Orders:  -     CBC & Differential  -     Comprehensive Metabolic Panel  -     Adult Transthoracic Echo Complete W/ Cont if Necessary Per Protocol; Future  -     XR Chest PA & Lateral; Future  -     Ambulatory Referral to Cardiology    4. Arthralgia, unspecified joint  Assessment & Plan:  Will call with lab results and  further recommendations.     Orders:  -     Rheumatoid Factor  -     Uric Acid  -     Sedimentation Rate  -     C-reactive Protein    5. Chronic deep vein thrombosis (DVT) of proximal vein of lower extremity, unspecified laterality (CMS/HCC)  Assessment & Plan:  Continue anticoagulation therapy and follow-up with hematology.       6. Mixed hyperlipidemia  Assessment & Plan:  Will call with lipid results and further recommendations.     The 10-year ASCVD risk score (Oma OCHOA Jr., et al., 2013) is: 2.1%    Values used to calculate the score:      Age: 55 years      Sex: Female      Is Non- : No      Diabetic: No      Tobacco smoker: No      Systolic Blood Pressure: 112 mmHg      Is BP treated: No      HDL Cholesterol: 54 mg/dL      Total Cholesterol: 221 mg/dL      Orders:  -     Comprehensive Metabolic Panel  -     Lipid Panel    7. RBBB  Assessment & Plan:  First noted on 2009 EKG, after having [place] Spotted Fever.       8. Chronic venous insufficiency  Assessment & Plan:  Continue use of compression stockings and leg elevation.       9. Chronic cutaneous venous stasis ulcer (CMS/HCC)  Assessment & Plan:  Currently healed/closed.   Continue compression stockings and topical treatments as needed.       10. Human papilloma virus (HPV) infection  Assessment & Plan:  Subsequent paps and HPV testing have been negative.       11. Benign essential microscopic hematuria  Assessment & Plan:  Chronic. She has had negative work-up by Urology.     Orders:  -     CBC & Differential    12. Osteopenia of multiple sites  Assessment & Plan:  Continue weight bearing exercise and increased calcium intake.   Repeat DEXA in 2022.       13. Allergic rhinitis, unspecified seasonality, unspecified trigger  Assessment & Plan:  Mild. Controlled with PRN use of oral antihistamine.       14. Chronic insomnia  Assessment & Plan:  She declines intervention.      15. Breast cancer screening by mammogram  -      Mammo Screening Digital Tomosynthesis Bilateral With CAD; Future    16. Screening for cervical cancer  -     PAP, Liquid Based ([facility] Only)    17. Screening for colon cancer  Comments:  Next colonoscopy due in 2026.           Follow Up   No follow-ups on file.    Patient was given instructions and counseling regarding her condition and health maintenance advice. Please see specific information in the After Visit Summary.     Destiny Rowley, PATRICIA 4/22/2021 18:59 EDT  This note was electronically signed.

## 2021-04-22 NOTE — ASSESSMENT & PLAN NOTE
This is a chronic issue.    She had a Holter monitor in 2016 that showed a baseline normal sinus rhythm, with a maximum rate of 160.  She had isolated supraventricular ectopic complexes and several PVCs.  She also had an echocardiogram in 2016 that showed an ejection fraction of 55 to 60% and normal valvular structure.  Will have her see Cardiology to discuss further work-up.

## 2021-04-24 LAB
ALBUMIN SERPL-MCNC: 4.9 G/DL (ref 3.8–4.9)
ALBUMIN/GLOB SERPL: 1.6 {RATIO} (ref 1.2–2.2)
ALP SERPL-CCNC: 92 IU/L (ref 39–117)
ALT SERPL-CCNC: 23 IU/L (ref 0–32)
AST SERPL-CCNC: 28 IU/L (ref 0–40)
BASOPHILS # BLD AUTO: 0 X10E3/UL (ref 0–0.2)
BASOPHILS NFR BLD AUTO: 1 %
BILIRUB SERPL-MCNC: 0.7 MG/DL (ref 0–1.2)
BUN SERPL-MCNC: 22 MG/DL (ref 6–24)
BUN/CREAT SERPL: 31 (ref 9–23)
CALCIUM SERPL-MCNC: 9.5 MG/DL (ref 8.7–10.2)
CHLORIDE SERPL-SCNC: 99 MMOL/L (ref 96–106)
CHOLEST SERPL-MCNC: 265 MG/DL (ref 100–199)
CO2 SERPL-SCNC: 21 MMOL/L (ref 20–29)
CREAT SERPL-MCNC: 0.71 MG/DL (ref 0.57–1)
CRP SERPL-MCNC: 4 MG/L (ref 0–10)
EOSINOPHIL # BLD AUTO: 0 X10E3/UL (ref 0–0.4)
EOSINOPHIL NFR BLD AUTO: 1 %
ERYTHROCYTE [DISTWIDTH] IN BLOOD BY AUTOMATED COUNT: 13.4 % (ref 11.7–15.4)
ERYTHROCYTE [SEDIMENTATION RATE] IN BLOOD BY WESTERGREN METHOD: 14 MM/HR (ref 0–40)
GLOBULIN SER CALC-MCNC: 3.1 G/DL (ref 1.5–4.5)
GLUCOSE SERPL-MCNC: 110 MG/DL (ref 65–99)
HCT VFR BLD AUTO: 43.7 % (ref 34–46.6)
HDLC SERPL-MCNC: 61 MG/DL
HGB BLD-MCNC: 14.6 G/DL (ref 11.1–15.9)
IMM GRANULOCYTES # BLD AUTO: 0 X10E3/UL (ref 0–0.1)
IMM GRANULOCYTES NFR BLD AUTO: 0 %
LDLC SERPL CALC-MCNC: 166 MG/DL (ref 0–99)
LYMPHOCYTES # BLD AUTO: 1.8 X10E3/UL (ref 0.7–3.1)
LYMPHOCYTES NFR BLD AUTO: 43 %
MCH RBC QN AUTO: 31.6 PG (ref 26.6–33)
MCHC RBC AUTO-ENTMCNC: 33.4 G/DL (ref 31.5–35.7)
MCV RBC AUTO: 95 FL (ref 79–97)
MONOCYTES # BLD AUTO: 0.3 X10E3/UL (ref 0.1–0.9)
MONOCYTES NFR BLD AUTO: 7 %
NEUTROPHILS # BLD AUTO: 2 X10E3/UL (ref 1.4–7)
NEUTROPHILS NFR BLD AUTO: 48 %
PLATELET # BLD AUTO: 248 X10E3/UL (ref 150–450)
POTASSIUM SERPL-SCNC: 4.8 MMOL/L (ref 3.5–5.2)
PROT SERPL-MCNC: 8 G/DL (ref 6–8.5)
RBC # BLD AUTO: 4.62 X10E6/UL (ref 3.77–5.28)
RHEUMATOID FACT SERPL-ACNC: 12 IU/ML (ref 0–13.9)
SODIUM SERPL-SCNC: 140 MMOL/L (ref 134–144)
TRIGL SERPL-MCNC: 205 MG/DL (ref 0–149)
URATE SERPL-MCNC: 5.2 MG/DL (ref 3–7.2)
VLDLC SERPL CALC-MCNC: 38 MG/DL (ref 5–40)
WBC # BLD AUTO: 4.2 X10E3/UL (ref 3.4–10.8)

## 2021-04-26 DIAGNOSIS — R73.01 IMPAIRED FASTING GLUCOSE: Primary | ICD-10-CM

## 2021-04-26 LAB
CYTOLOGIST CVX/VAG CYTO: NORMAL
CYTOLOGY CVX/VAG DOC CYTO: NORMAL
DX ICD CODE: NORMAL
HIV 1 & 2 AB SER-IMP: NORMAL
OTHER STN SPEC: NORMAL
STAT OF ADQ CVX/VAG CYTO-IMP: NORMAL

## 2021-04-27 PROBLEM — R73.03 PREDIABETES: Status: ACTIVE | Noted: 2021-04-27

## 2021-04-27 LAB
HBA1C MFR BLD: 6.1 % (ref 4.8–5.6)
Lab: NORMAL
WRITTEN AUTHORIZATION: NORMAL

## 2021-04-30 ENCOUNTER — HOSPITAL ENCOUNTER (OUTPATIENT)
Dept: MAMMOGRAPHY | Facility: HOSPITAL | Age: 56
Discharge: HOME | End: 2021-04-30

## 2021-04-30 ENCOUNTER — HOSPITAL ENCOUNTER (OUTPATIENT)
Dept: GENERAL RADIOLOGY | Facility: HOSPITAL | Age: 56
Discharge: HOME | End: 2021-04-30

## 2021-04-30 DIAGNOSIS — R06.09 DYSPNEA ON EXERTION: ICD-10-CM

## 2021-04-30 DIAGNOSIS — Z12.31 BREAST CANCER SCREENING BY MAMMOGRAM: ICD-10-CM

## 2021-04-30 PROCEDURE — 71046 X-RAY EXAM CHEST 2 VIEWS: CPT

## 2021-04-30 PROCEDURE — 77063 BREAST TOMOSYNTHESIS BI: CPT

## 2021-04-30 PROCEDURE — 77067 SCR MAMMO BI INCL CAD: CPT

## 2021-05-10 ENCOUNTER — HOSPITAL ENCOUNTER (OUTPATIENT)
Dept: CARDIOLOGY | Facility: HOSPITAL | Age: 56
Discharge: HOME | End: 2021-05-10
Admitting: NURSE PRACTITIONER

## 2021-05-10 VITALS
DIASTOLIC BLOOD PRESSURE: 66 MMHG | WEIGHT: 208 LBS | BODY MASS INDEX: 29.78 KG/M2 | HEART RATE: 89 BPM | SYSTOLIC BLOOD PRESSURE: 131 MMHG | HEIGHT: 70 IN

## 2021-05-10 DIAGNOSIS — R06.09 DYSPNEA ON EXERTION: ICD-10-CM

## 2021-05-10 PROCEDURE — 93306 TTE W/DOPPLER COMPLETE: CPT | Performed by: INTERNAL MEDICINE

## 2021-05-10 PROCEDURE — 93306 TTE W/DOPPLER COMPLETE: CPT

## 2021-05-13 ENCOUNTER — TELEPHONE (OUTPATIENT)
Dept: FAMILY MEDICINE CLINIC | Facility: CLINIC | Age: 56
End: 2021-05-13

## 2021-05-13 DIAGNOSIS — R73.03 PREDIABETES: Primary | ICD-10-CM

## 2021-05-13 RX ORDER — FLASH GLUCOSE SENSOR
1 KIT MISCELLANEOUS
Qty: 6 EACH | Refills: 3 | Status: SHIPPED | OUTPATIENT
Start: 2021-05-13 | End: 2022-04-20

## 2021-05-13 RX ORDER — FLASH GLUCOSE SCANNING READER
1 EACH MISCELLANEOUS
Qty: 6 EACH | Refills: 3 | Status: SHIPPED | OUTPATIENT
Start: 2021-05-13 | End: 2022-04-27 | Stop reason: SDUPTHER

## 2021-05-13 NOTE — TELEPHONE ENCOUNTER
Patient called and is requesting an order for a Freestyle [employer] [place] [employer]. She has checked with her insurance and it will cover this.

## 2021-05-14 LAB
ASCENDING AORTA: 3.4 CM
BH CV ECHO MEAS - ACS: 2.2 CM
BH CV ECHO MEAS - AO MAX PG (FULL): 6 MMHG
BH CV ECHO MEAS - AO MAX PG: 9.3 MMHG
BH CV ECHO MEAS - AO MEAN PG (FULL): 3.9 MMHG
BH CV ECHO MEAS - AO MEAN PG: 5.8 MMHG
BH CV ECHO MEAS - AO ROOT AREA (BSA CORRECTED): 1.5
BH CV ECHO MEAS - AO ROOT AREA: 8.2 CM^2
BH CV ECHO MEAS - AO ROOT DIAM: 3.2 CM
BH CV ECHO MEAS - AO V2 MAX: 152.2 CM/SEC
BH CV ECHO MEAS - AO V2 MEAN: 117.3 CM/SEC
BH CV ECHO MEAS - AO V2 VTI: 30.6 CM
BH CV ECHO MEAS - ASC AORTA: 3.4 CM
BH CV ECHO MEAS - AVA(I,A): 2.2 CM^2
BH CV ECHO MEAS - AVA(I,D): 2.2 CM^2
BH CV ECHO MEAS - AVA(V,A): 2.1 CM^2
BH CV ECHO MEAS - AVA(V,D): 2.1 CM^2
BH CV ECHO MEAS - BSA(HAYCOCK): 2.2 M^2
BH CV ECHO MEAS - BSA: 2.1 M^2
BH CV ECHO MEAS - BZI_BMI: 29.8 KILOGRAMS/M^2
BH CV ECHO MEAS - BZI_METRIC_HEIGHT: 177.8 CM
BH CV ECHO MEAS - BZI_METRIC_WEIGHT: 94.3 KG
BH CV ECHO MEAS - EDV(CUBED): 172.1 ML
BH CV ECHO MEAS - EDV(MOD-SP2): 123.1 ML
BH CV ECHO MEAS - EDV(MOD-SP4): 103 ML
BH CV ECHO MEAS - EDV(TEICH): 151.3 ML
BH CV ECHO MEAS - EF(CUBED): 50.9 %
BH CV ECHO MEAS - EF(MOD-BP): 53 %
BH CV ECHO MEAS - EF(MOD-SP2): 53.2 %
BH CV ECHO MEAS - EF(MOD-SP4): 52.4 %
BH CV ECHO MEAS - EF(TEICH): 42.4 %
BH CV ECHO MEAS - ESV(CUBED): 84.5 ML
BH CV ECHO MEAS - ESV(MOD-SP2): 57.5 ML
BH CV ECHO MEAS - ESV(MOD-SP4): 49.1 ML
BH CV ECHO MEAS - ESV(TEICH): 87.1 ML
BH CV ECHO MEAS - FS: 21.1 %
BH CV ECHO MEAS - IVS/LVPW: 0.97
BH CV ECHO MEAS - IVSD: 0.81 CM
BH CV ECHO MEAS - LA DIMENSION(2D): 4 CM
BH CV ECHO MEAS - LA DIMENSION: 3.8 CM
BH CV ECHO MEAS - LA/AO: 1.2
BH CV ECHO MEAS - LAT PEAK E' VEL: 10 CM/SEC
BH CV ECHO MEAS - LV DIASTOLIC VOL/BSA (35-75): 48.5 ML/M^2
BH CV ECHO MEAS - LV MASS(C)D: 168 GRAMS
BH CV ECHO MEAS - LV MASS(C)DI: 79.1 GRAMS/M^2
BH CV ECHO MEAS - LV MAX PG: 3.2 MMHG
BH CV ECHO MEAS - LV MEAN PG: 1.9 MMHG
BH CV ECHO MEAS - LV SYSTOLIC VOL/BSA (12-30): 23.1 ML/M^2
BH CV ECHO MEAS - LV V1 MAX: 89.6 CM/SEC
BH CV ECHO MEAS - LV V1 MEAN: 66.4 CM/SEC
BH CV ECHO MEAS - LV V1 VTI: 18.8 CM
BH CV ECHO MEAS - LVIDD: 5.6 CM
BH CV ECHO MEAS - LVIDS: 4.4 CM
BH CV ECHO MEAS - LVOT AREA: 3.5 CM^2
BH CV ECHO MEAS - LVOT DIAM: 2.1 CM
BH CV ECHO MEAS - LVPWD: 0.83 CM
BH CV ECHO MEAS - MED PEAK E' VEL: 8 CM/SEC
BH CV ECHO MEAS - MV A MAX VEL: 75.7 CM/SEC
BH CV ECHO MEAS - MV DEC SLOPE: 250 CM/SEC^2
BH CV ECHO MEAS - MV DEC TIME: 0.23 SEC
BH CV ECHO MEAS - MV E MAX VEL: 57.9 CM/SEC
BH CV ECHO MEAS - MV E/A: 0.76
BH CV ECHO MEAS - MV MAX PG: 2.7 MMHG
BH CV ECHO MEAS - MV MEAN PG: 1.2 MMHG
BH CV ECHO MEAS - MV P1/2T: 56 MSEC
BH CV ECHO MEAS - MV V2 MAX: 81.7 CM/SEC
BH CV ECHO MEAS - MV V2 MEAN: 52.4 CM/SEC
BH CV ECHO MEAS - MV V2 VTI: 18.4 CM
BH CV ECHO MEAS - MVA(P1/2T): 3.9 CM2
BH CV ECHO MEAS - MVA(VTI): 3.6 CM^2
BH CV ECHO MEAS - PA ACC SLOPE: 520 CM/SEC2
BH CV ECHO MEAS - PA ACC TIME: 0.11 SEC
BH CV ECHO MEAS - PA MAX PG (FULL): 1.7 MMHG
BH CV ECHO MEAS - PA MAX PG: 3 MMHG
BH CV ECHO MEAS - PA MEAN PG (FULL): 1.1 MMHG
BH CV ECHO MEAS - PA MEAN PG: 1.8 MMHG
BH CV ECHO MEAS - PA PR(ACCEL): 31 MMHG
BH CV ECHO MEAS - PA V2 MAX: 87 CM/SEC
BH CV ECHO MEAS - PA V2 MEAN: 65.4 CM/SEC
BH CV ECHO MEAS - PA V2 VTI: 17.3 CM
BH CV ECHO MEAS - PAPD(PI EDV): 5 MMHG
BH CV ECHO MEAS - PI END-D VEL: 76.6 CM/SEC
BH CV ECHO MEAS - PULM A REVS DUR: 0.1 SEC
BH CV ECHO MEAS - PULM A REVS VEL: 30.1 CM/SEC
BH CV ECHO MEAS - PULM DIAS VEL: 53.2 CM/SEC
BH CV ECHO MEAS - PULM S/D: 1.1
BH CV ECHO MEAS - PULM SYS VEL: 60.1 CM/SEC
BH CV ECHO MEAS - RAP SYSTOLE: 3 MMHG
BH CV ECHO MEAS - RV MAX PG: 1.4 MMHG
BH CV ECHO MEAS - RV MEAN PG: 0.76 MMHG
BH CV ECHO MEAS - RV V1 MAX: 58.2 CM/SEC
BH CV ECHO MEAS - RV V1 MEAN: 41 CM/SEC
BH CV ECHO MEAS - RV V1 VTI: 11.6 CM
BH CV ECHO MEAS - RVSP: 26.7 MMHG
BH CV ECHO MEAS - SI(AO): 118 ML/M^2
BH CV ECHO MEAS - SI(CUBED): 41.3 ML/M^2
BH CV ECHO MEAS - SI(LVOT): 31 ML/M^2
BH CV ECHO MEAS - SI(MOD-SP2): 30.9 ML/M^2
BH CV ECHO MEAS - SI(MOD-SP4): 25.4 ML/M^2
BH CV ECHO MEAS - SI(TEICH): 30.3 ML/M^2
BH CV ECHO MEAS - SV(AO): 250.5 ML
BH CV ECHO MEAS - SV(CUBED): 87.7 ML
BH CV ECHO MEAS - SV(LVOT): 65.9 ML
BH CV ECHO MEAS - SV(MOD-SP2): 65.5 ML
BH CV ECHO MEAS - SV(MOD-SP4): 54 ML
BH CV ECHO MEAS - SV(TEICH): 64.2 ML
BH CV ECHO MEAS - TAPSE (>1.6): 2.9 CM
BH CV ECHO MEAS - TR MAX PG: 24 MMHG
BH CV ECHO MEAS - TR MAX VEL: 243.4 CM/SEC
BH CV ECHO MEASUREMENTS AVERAGE E/E' RATIO: 6.43
BH CV VAS BP LEFT ARM: NORMAL MMHG
BH CV XLRA - RV BASE: 3.2 CM
BH CV XLRA - RV MID: 2.4 CM
BH CV XLRA - TDI S': 13 CM/SEC
LEFT ATRIUM VOLUME INDEX: 38 ML/M2
LEFT ATRIUM VOLUME: 81 CM3

## 2021-05-16 PROBLEM — I51.9 LEFT VENTRICULAR DIASTOLIC DYSFUNCTION: Status: ACTIVE | Noted: 2021-05-16

## 2021-06-03 PROBLEM — E78.2 MIXED HYPERLIPIDEMIA: Status: ACTIVE | Noted: 2020-01-08

## 2021-06-03 NOTE — PROGRESS NOTES
Date of Office Visit: 2021  Encounter Provider: Dr. Michael Curry  Place of Service: [facility] CARDIOLOGY [place]  Patient Name: Lexi Hannah  :1965  Destiny Rowley APRN    Chief Complaint   Patient presents with   • Palpitations     consult/echo   • Hyperlipidemia     History of Present Illness:    I am pleased to see Mrs. Hannah in my office today as a new consultation.    As you know, patient is 55-year-old white female whose past medical history is insignificant for any medical illness except for history of factor Leiden deficiency, DVT, on chronic anticoagulation therapy, who is referred to me for abnormal EKG and echocardiogram and shortness of breath.    Patient reports that she is getting easily short of breath.  She does her walking exercises and now she is getting easily short of breath on walking.  She denies any chest discomfort or tightness.  She does have palpitation.  She described this as a skipping of the heartbeat.  Patient denies any syncope or presyncope.  No orthopnea PND.  No leg edema noted.    Patient does not have previous history of CAD, PCI or MI.  She does not smoke or abuse alcohol.  No significant family history of premature CAD    EKG done at PCP office showed normal sinus rhythm with right bundle branch block and PVCs were noted.  Echocardiogram showed normal left ventricle size and function and EF was 55 to 60%.  Diastolic dysfunction was noted.    I would recommend that patient should proceed with stress test with [facility] imaging.  If this test is negative I would recommend aerobic exercises and stamina building.  Her palpitations are most likely due to PVCs.  If palpitation persist and frequent I would consider Holter monitor.  Patient may need sleep studies        Past Medical History:   Diagnosis Date   • Abnormal inferior vena caval connection     Inferior Vena Cava Abnormality/interruption   • Allergic rhinitis    • Blurred vision, left eye 2020    • Chronic cutaneous venous stasis ulcer (CMS/HCC) 2/26/2020   • Chronic insomnia 2/26/2020   • Chronic venous insufficiency 2/26/2020   • Diverticulosis    • DVT (deep vein thrombosis) in pregnancy    • Hearing loss, conductive    • Hematuria, unspecified    • High risk human papilloma virus (HPV) infection of cervix     HPV+ (neg 16/18 (2/2016) 3/2018 Co-testing negative   • Hydronephrosis, bilateral    • Hyperlipidemia    • Left ventricular diastolic dysfunction 5/16/2021    Grade I   • Menorrhagia    • Microscopic hematuria     Urology evaluated   • Obstructive nephropathy    • Osteopenia    • Overweight    • Pelvicaliectasis    • Perimenopausal 2/26/2020   • Prediabetes 4/27/2021   • Right bundle branch block    • [place] spotted fever    • Thyroid disorder     Thyroid Cyst/Nodule   • Uterine fibroid          History reviewed. No pertinent surgical history.        Current Outpatient Medications:   •  Calcium Carb-Cholecalciferol (CALCIUM + D3) 600-200 MG-UNIT tablet, CALCIUM + D3 600-200 MG-UNIT TABS, Disp: , Rfl:   •  Continuous Blood Gluc  (FreeStyle Vikas 14 Day [place]) device, 1 Device Every 14 (Fourteen) Days. Dx: R73.03, Disp: 6 each, Rfl: 3  •  Continuous Blood Gluc Sensor (FreeStyle Vikas 14 Day Sensor) misc, 1 each Every 14 (Fourteen) Days. Dx: R73.03, Disp: 6 each, Rfl: 3  •  Multiple Vitamin (MULTIVITAMIN) capsule, MULTIVITAMINS CAPS, Disp: , Rfl:   •  Xarelto 20 MG tablet, TAKE ONE TABLET BY MOUTH ONCE DAILY WITH DINNER, Disp: 90 tablet, Rfl: 0      Social History     Socioeconomic History   • Marital status:      Spouse name: Not on file   • Number of children: Not on file   • Years of education: Not on file   • Highest education level: Not on file   Tobacco Use   • Smoking status: Never Smoker   • Smokeless tobacco: Never Used   Vaping Use   • Vaping Use: Never used   Substance and Sexual Activity   • Alcohol use: Yes   • Drug use: Never   • Sexual activity: Defer  "        Review of Systems   Constitutional: Negative for chills and fever.   HENT: Negative for ear discharge and nosebleeds.    Eyes: Negative for discharge and redness.   Cardiovascular: Positive for palpitations. Negative for chest pain, orthopnea, paroxysmal nocturnal dyspnea and syncope.   Respiratory: Positive for shortness of breath. Negative for cough and wheezing.    Endocrine: Negative for heat intolerance.   Skin: Negative for rash.   Musculoskeletal: Positive for arthritis and joint pain. Negative for myalgias.   Gastrointestinal: Negative for abdominal pain, melena, nausea and vomiting.   Genitourinary: Negative for dysuria and hematuria.   Neurological: Negative for dizziness, light-headedness, numbness and tremors.   Psychiatric/Behavioral: Negative for depression. The patient is not nervous/anxious.        Procedures    Procedures    No orders to display           Objective:    /77   Pulse 94   Ht 177.8 cm (70\")   Wt 93 kg (205 lb)   LMP 10/12/2020 (Approximate)   BMI 29.41 kg/m²         Constitutional:       Appearance: Well-developed.   Eyes:      General: No scleral icterus.        Right eye: No discharge.   HENT:      Head: Normocephalic and atraumatic.   Neck:      Thyroid: No thyromegaly.      Lymphadenopathy: No cervical adenopathy.   Pulmonary:      Effort: Pulmonary effort is normal. No respiratory distress.      Breath sounds: Normal breath sounds. No wheezing. No rales.   Cardiovascular:      Normal rate. Regular rhythm.      No gallop.   Abdominal:      Tenderness: There is no abdominal tenderness.   Skin:     Findings: No erythema or rash.   Neurological:      Mental Status: Alert and oriented to person, place, and time.             Assessment:       Diagnosis Plan   1. Mixed hyperlipidemia  Stress Test With Myocardial Perfusion One Day   2. Palpitations  Stress Test With Myocardial Perfusion One Day   3. RBBB  Stress Test With Myocardial Perfusion One Day   4. Shortness of " breath  Stress Test With Myocardial Perfusion One Day   5. PVC (premature ventricular contraction)  Stress Test With Myocardial Perfusion One Day            Plan:       MDM:    1.  Shortness of breath:    I would recommend to proceed with stress test    2.  PVCs:    Patient had PVCs and that may be because of her palpitation.  Consider Holter monitor if symptoms are frequent.  Patient may need sleep studies    3.  Palpitation:    Most likely cause of palpitations are PVCs.  I would recommend Holter monitor in future    4.  History of DVT:    Patient is on Xarelto

## 2021-06-04 ENCOUNTER — OFFICE VISIT (OUTPATIENT)
Dept: CARDIOLOGY | Facility: CLINIC | Age: 56
End: 2021-06-04

## 2021-06-04 VITALS
SYSTOLIC BLOOD PRESSURE: 113 MMHG | DIASTOLIC BLOOD PRESSURE: 77 MMHG | BODY MASS INDEX: 29.35 KG/M2 | HEART RATE: 94 BPM | HEIGHT: 70 IN | WEIGHT: 205 LBS

## 2021-06-04 DIAGNOSIS — R00.2 PALPITATIONS: ICD-10-CM

## 2021-06-04 DIAGNOSIS — I49.3 PVC (PREMATURE VENTRICULAR CONTRACTION): ICD-10-CM

## 2021-06-04 DIAGNOSIS — R06.02 SHORTNESS OF BREATH: ICD-10-CM

## 2021-06-04 DIAGNOSIS — E78.2 MIXED HYPERLIPIDEMIA: Primary | ICD-10-CM

## 2021-06-04 DIAGNOSIS — I45.10 RBBB: ICD-10-CM

## 2021-06-04 PROCEDURE — 99204 OFFICE O/P NEW MOD 45 MIN: CPT | Performed by: INTERNAL MEDICINE

## 2021-06-07 RX ORDER — RIVAROXABAN 20 MG/1
TABLET, FILM COATED ORAL
Qty: 90 TABLET | Refills: 2 | Status: SHIPPED | OUTPATIENT
Start: 2021-06-07 | End: 2021-06-08

## 2021-06-08 RX ORDER — RIVAROXABAN 20 MG/1
TABLET, FILM COATED ORAL
Qty: 90 TABLET | Refills: 2 | Status: SHIPPED | OUTPATIENT
Start: 2021-06-08 | End: 2022-04-27 | Stop reason: SDUPTHER

## 2021-07-07 ENCOUNTER — HOSPITAL ENCOUNTER (OUTPATIENT)
Dept: NUCLEAR MEDICINE | Facility: HOSPITAL | Age: 56
Discharge: HOME | End: 2021-07-07

## 2021-07-07 DIAGNOSIS — R06.02 SHORTNESS OF BREATH: ICD-10-CM

## 2021-07-07 DIAGNOSIS — R00.2 PALPITATIONS: ICD-10-CM

## 2021-07-07 DIAGNOSIS — I49.3 PVC (PREMATURE VENTRICULAR CONTRACTION): ICD-10-CM

## 2021-07-07 DIAGNOSIS — I45.10 RBBB: ICD-10-CM

## 2021-07-07 DIAGNOSIS — E78.2 MIXED HYPERLIPIDEMIA: ICD-10-CM

## 2021-07-07 PROCEDURE — 93016 CV STRESS TEST SUPVJ ONLY: CPT | Performed by: INTERNAL MEDICINE

## 2021-07-07 PROCEDURE — 93017 CV STRESS TEST TRACING ONLY: CPT

## 2021-07-07 PROCEDURE — 0 TECHNETIUM SESTAMIBI: Performed by: INTERNAL MEDICINE

## 2021-07-07 PROCEDURE — 78452 HT MUSCLE IMAGE SPECT MULT: CPT

## 2021-07-07 PROCEDURE — A9500 TC99M SESTAMIBI: HCPCS | Performed by: INTERNAL MEDICINE

## 2021-07-07 PROCEDURE — 93018 CV STRESS TEST I&R ONLY: CPT | Performed by: INTERNAL MEDICINE

## 2021-07-07 PROCEDURE — 78452 HT MUSCLE IMAGE SPECT MULT: CPT | Performed by: INTERNAL MEDICINE

## 2021-07-07 RX ADMIN — TECHNETIUM TC 99M SESTAMIBI 1 DOSE: 1 INJECTION INTRAVENOUS at 08:52

## 2021-07-07 RX ADMIN — TECHNETIUM TC 99M SESTAMIBI 1 DOSE: 1 INJECTION INTRAVENOUS at 10:08

## 2021-07-11 LAB
BH CV REST NUCLEAR ISOTOPE DOSE: 7.9 MCI
BH CV STRESS BP STAGE 1: NORMAL
BH CV STRESS BP STAGE 2: NORMAL
BH CV STRESS DURATION MIN STAGE 1: 3
BH CV STRESS DURATION MIN STAGE 2: 3
BH CV STRESS DURATION SEC STAGE 1: 0
BH CV STRESS DURATION SEC STAGE 2: 0
BH CV STRESS GRADE STAGE 1: 10
BH CV STRESS GRADE STAGE 2: 12
BH CV STRESS HR STAGE 1: 129
BH CV STRESS HR STAGE 2: 151
BH CV STRESS METS STAGE 1: 5
BH CV STRESS METS STAGE 2: 7.5
BH CV STRESS NUCLEAR ISOTOPE DOSE: 21.7 MCI
BH CV STRESS PROTOCOL 1: NORMAL
BH CV STRESS RECOVERY BP: NORMAL MMHG
BH CV STRESS RECOVERY HR: 92 BPM
BH CV STRESS SPEED STAGE 1: 1.7
BH CV STRESS SPEED STAGE 2: 2.5
BH CV STRESS STAGE 1: 1
BH CV STRESS STAGE 2: 2
LV EF NUC BP: 66 %
MAXIMAL PREDICTED HEART RATE: 163 BPM
PERCENT MAX PREDICTED HR: 92.64 %
STRESS BASELINE BP: NORMAL MMHG
STRESS BASELINE HR: 95 BPM
STRESS PERCENT HR: 109 %
STRESS POST EXERCISE DUR MIN: 6 MIN
STRESS POST EXERCISE DUR SEC: 7 SEC
STRESS POST PEAK BP: NORMAL MMHG
STRESS POST PEAK HR: 151 BPM
STRESS TARGET HR: 139 BPM

## 2021-07-13 ENCOUNTER — TELEPHONE (OUTPATIENT)
Dept: CARDIOLOGY | Facility: CLINIC | Age: 56
End: 2021-07-13

## 2021-07-26 DIAGNOSIS — R73.03 PREDIABETES: Primary | ICD-10-CM

## 2021-07-26 DIAGNOSIS — E78.2 MIXED HYPERLIPIDEMIA: ICD-10-CM

## 2021-08-03 ENCOUNTER — TELEPHONE (OUTPATIENT)
Dept: FAMILY MEDICINE CLINIC | Facility: CLINIC | Age: 56
End: 2021-08-03

## 2021-08-03 NOTE — TELEPHONE ENCOUNTER
----- Message from PATRICIA Santoro sent at 7/26/2021  3:00 PM EDT -----  Regarding: FW: Labs due  Please give patient a reminder call. It is time for 3-month follow-up on labs. I've already sent the orders to [facility]. She just needs to get them done when she has been fasting for at least 8 hours.     ----- Message -----  From: Destiny Rowley APRN  Sent: 7/26/2021  To: PATRICIA Santoro  Subject: Labs due                                         Repeat A1c, CMP, Lipids, UA

## 2021-10-14 NOTE — PROGRESS NOTES
Date of Office Visit: 10/15/2021  Encounter Provider: Dr. Michael Curry  Place of Service: [facility] [place]  Patient Name: Lexi Hannah  :1965  Destiny Rowley APRN    Chief Complaint   Patient presents with   • Palpitations     4 month follow up/stress test/echo   • Hyperlipidemia   • Shortness of Breath     History of Present Illness    I am pleased to see Mrs. Hannah in my office today as a follow-up    As you know, patient is 56-year-old white female whose past medical history is insignificant for any medical illness except for history of factor Leiden deficiency, DVT, on chronic anticoagulation therapy, who was referred to me for abnormal EKG and echocardiogram and shortness of breath.    In 2021, patient was presented to me for symptom of shortness of breath.  She underwent echocardiogram which showed normal left ventricle size and function and LVEF was 55 to 60%.  Diastolic dysfunction was noted patient underwent stress test which was negative for ischemia or myocardial infarction.    Since the previous visit, patient is overall doing well.  Her symptom of palpitation which was reported previously has improved.  Patient denies any orthopnea or PND patient has underlying shortness of breath which is stable.  No leg edema noted.    EKG showed sinus rhythm with right bundle branch block.  Left posterior fascicular block noted.    Patient is feeling better.  Still have shortness of breath.  I suspect this is probably due to deconditioning.  Patient is advised to increase aerobic activity.  I would like to see increase stamina.  If she continues to have shortness of breath, patient should be considered for possible CT as well as VQ scan to rule out chronic pulmonary embolism.  I educated the patient about possibility of sleep apnea.  Patient is advised to monitor the symptom and if they are present patient may need sleep studies        Past Medical History:   Diagnosis Date   •  Abnormal inferior vena caval connection     Inferior Vena Cava Abnormality/interruption   • Allergic rhinitis    • Asthma    • Blurred vision, left eye 2/26/2020   • Chronic cutaneous venous stasis ulcer (HCC) 2/26/2020   • Chronic insomnia 2/26/2020   • Chronic venous insufficiency 2/26/2020   • Diverticulosis    • DVT (deep vein thrombosis) in pregnancy    • Hearing loss, conductive    • Hematuria, unspecified    • High risk human papilloma virus (HPV) infection of cervix     HPV+ (neg 16/18 (2/2016) 3/2018 Co-testing negative   • Hydronephrosis, bilateral    • Hyperlipidemia    • Left ventricular diastolic dysfunction 5/16/2021    Grade I   • Menorrhagia    • Microscopic hematuria     Urology evaluated   • Obstructive nephropathy    • Osteopenia    • Overweight    • Pelvicaliectasis    • Perimenopausal 2/26/2020   • Prediabetes 4/27/2021   • Right bundle branch block    • [place] spotted fever    • Thyroid disorder     Thyroid Cyst/Nodule   • Uterine fibroid          History reviewed. No pertinent surgical history.        Current Outpatient Medications:   •  Continuous Blood Gluc  (FreeStyle Vikas 14 Day [place]) device, 1 Device Every 14 (Fourteen) Days. Dx: R73.03, Disp: 6 each, Rfl: 3  •  Continuous Blood Gluc Sensor (FreeStyle Vikas 14 Day Sensor) misc, 1 each Every 14 (Fourteen) Days. Dx: R73.03, Disp: 6 each, Rfl: 3  •  Xarelto 20 MG tablet, TAKE ONE TABLET BY MOUTH ONCE DAILY WITH DINNER, Disp: 90 tablet, Rfl: 2      Social History     Socioeconomic History   • Marital status:    Tobacco Use   • Smoking status: Never Smoker   • Smokeless tobacco: Never Used   Vaping Use   • Vaping Use: Never used   Substance and Sexual Activity   • Alcohol use: Yes     Alcohol/week: 2.0 standard drinks     Types: 2 Standard drinks or equivalent per week   • Drug use: Never   • Sexual activity: Yes     Partners: Male     Birth control/protection: Post-menopausal         Review of Systems  "  Constitutional: Negative for chills and fever.   HENT: Negative for ear discharge and nosebleeds.    Eyes: Negative for discharge and redness.   Cardiovascular: Negative for chest pain, orthopnea, palpitations, paroxysmal nocturnal dyspnea and syncope.   Respiratory: Positive for shortness of breath. Negative for cough and wheezing.    Endocrine: Negative for heat intolerance.   Skin: Negative for rash.   Musculoskeletal: Negative for arthritis and myalgias.   Gastrointestinal: Negative for abdominal pain, melena, nausea and vomiting.   Genitourinary: Negative for dysuria and hematuria.   Neurological: Negative for dizziness, light-headedness, numbness and tremors.   Psychiatric/Behavioral: Negative for depression. The patient is not nervous/anxious.        Procedures      ECG 12 Lead    Date/Time: 10/15/2021 10:50 AM  Performed by: Michael Curry MD  Authorized by: Michael Curry MD   Comparison: compared with previous ECG   Similar to previous ECG  Rhythm: sinus rhythm  Conduction: right bundle branch block and left posterior fascicular block    Clinical impression: abnormal EKG            ECG 12 Lead    (Results Pending)           Objective:    /80   Pulse 86   Ht 177.8 cm (70\")   Wt 93 kg (205 lb)   LMP 10/12/2020 (Approximate)   BMI 29.41 kg/m²         Constitutional:       Appearance: Well-developed.   Eyes:      General: No scleral icterus.        Right eye: No discharge.   HENT:      Head: Normocephalic and atraumatic.   Neck:      Thyroid: No thyromegaly.      Lymphadenopathy: No cervical adenopathy.   Pulmonary:      Effort: Pulmonary effort is normal. No respiratory distress.      Breath sounds: Normal breath sounds. No wheezing. No rales.   Cardiovascular:      Normal rate. Regular rhythm.      No gallop.   Abdominal:      Tenderness: There is no abdominal tenderness.   Skin:     Findings: No erythema or rash.   Neurological:      Mental Status: Alert and oriented to person, place, and time. "             Assessment:       Diagnosis Plan   1. Mixed hyperlipidemia  ECG 12 Lead   2. Palpitations  ECG 12 Lead   3. Dyspnea on exertion  ECG 12 Lead   4. RBBB  ECG 12 Lead            Plan:       MDM:    1.  Shortness of breath:    I personally think it is due to deconditioning I would recommend increase aerobic activity and stamina building exercises.  If she continues to have shortness of breath consider VQ scan/CT pulmonary embolism to rule out chronic thrombus embolic disease of lungs.    2.  Palpitation:    Her symptoms are contained    3.  Hyperlipidemia:    Patient is doing diet modification and lifestyle changes patient may need statin therapy    4.  Factor Leiden deficiency:    Patient is on Xarelto continue that I will see the patient as needed

## 2021-10-15 ENCOUNTER — OFFICE VISIT (OUTPATIENT)
Dept: CARDIOLOGY | Facility: CLINIC | Age: 56
End: 2021-10-15

## 2021-10-15 VITALS
SYSTOLIC BLOOD PRESSURE: 126 MMHG | DIASTOLIC BLOOD PRESSURE: 80 MMHG | HEART RATE: 86 BPM | HEIGHT: 70 IN | WEIGHT: 205 LBS | BODY MASS INDEX: 29.35 KG/M2

## 2021-10-15 DIAGNOSIS — I45.10 RBBB: ICD-10-CM

## 2021-10-15 DIAGNOSIS — R00.2 PALPITATIONS: ICD-10-CM

## 2021-10-15 DIAGNOSIS — E78.2 MIXED HYPERLIPIDEMIA: Primary | ICD-10-CM

## 2021-10-15 DIAGNOSIS — R06.09 DYSPNEA ON EXERTION: ICD-10-CM

## 2021-10-15 PROCEDURE — 93000 ELECTROCARDIOGRAM COMPLETE: CPT | Performed by: INTERNAL MEDICINE

## 2021-10-15 PROCEDURE — 99213 OFFICE O/P EST LOW 20 MIN: CPT | Performed by: INTERNAL MEDICINE

## 2022-02-25 DIAGNOSIS — N13.30 HYDRONEPHROSIS, UNSPECIFIED HYDRONEPHROSIS TYPE: ICD-10-CM

## 2022-02-25 DIAGNOSIS — M85.89 OSTEOPENIA OF MULTIPLE SITES: ICD-10-CM

## 2022-02-25 DIAGNOSIS — E04.1 THYROID NODULE: ICD-10-CM

## 2022-02-25 DIAGNOSIS — Z12.31 BREAST CANCER SCREENING BY MAMMOGRAM: ICD-10-CM

## 2022-02-25 DIAGNOSIS — R73.03 PREDIABETES: ICD-10-CM

## 2022-02-25 DIAGNOSIS — R31.1 BENIGN ESSENTIAL MICROSCOPIC HEMATURIA: ICD-10-CM

## 2022-02-25 DIAGNOSIS — E78.2 MIXED HYPERLIPIDEMIA: Primary | ICD-10-CM

## 2022-04-20 DIAGNOSIS — R73.03 PREDIABETES: ICD-10-CM

## 2022-04-20 RX ORDER — FLASH GLUCOSE SENSOR
KIT MISCELLANEOUS
Qty: 6 EACH | Refills: 11 | Status: SHIPPED | OUTPATIENT
Start: 2022-04-20 | End: 2022-04-27 | Stop reason: SDUPTHER

## 2022-04-21 LAB
ALBUMIN SERPL-MCNC: 4.7 G/DL (ref 3.8–4.9)
ALBUMIN/GLOB SERPL: 1.6 {RATIO} (ref 1.2–2.2)
ALP SERPL-CCNC: 87 IU/L (ref 44–121)
ALT SERPL-CCNC: 17 IU/L (ref 0–32)
AST SERPL-CCNC: 23 IU/L (ref 0–40)
BASOPHILS # BLD AUTO: 0 X10E3/UL (ref 0–0.2)
BASOPHILS NFR BLD AUTO: 1 %
BILIRUB SERPL-MCNC: 0.9 MG/DL (ref 0–1.2)
BUN SERPL-MCNC: 14 MG/DL (ref 6–24)
BUN/CREAT SERPL: 17 (ref 9–23)
CALCIUM SERPL-MCNC: 9.8 MG/DL (ref 8.7–10.2)
CHLORIDE SERPL-SCNC: 100 MMOL/L (ref 96–106)
CHOLEST SERPL-MCNC: 279 MG/DL (ref 100–199)
CO2 SERPL-SCNC: 23 MMOL/L (ref 20–29)
CREAT SERPL-MCNC: 0.84 MG/DL (ref 0.57–1)
EGFRCR SERPLBLD CKD-EPI 2021: 80 ML/MIN/1.73
EOSINOPHIL # BLD AUTO: 0 X10E3/UL (ref 0–0.4)
EOSINOPHIL NFR BLD AUTO: 1 %
ERYTHROCYTE [DISTWIDTH] IN BLOOD BY AUTOMATED COUNT: 12.6 % (ref 11.7–15.4)
GLOBULIN SER CALC-MCNC: 2.9 G/DL (ref 1.5–4.5)
GLUCOSE SERPL-MCNC: 101 MG/DL (ref 65–99)
HBA1C MFR BLD: 5.8 % (ref 4.8–5.6)
HCT VFR BLD AUTO: 41.2 % (ref 34–46.6)
HDLC SERPL-MCNC: 64 MG/DL
HGB BLD-MCNC: 13.8 G/DL (ref 11.1–15.9)
IMM GRANULOCYTES # BLD AUTO: 0 X10E3/UL (ref 0–0.1)
IMM GRANULOCYTES NFR BLD AUTO: 0 %
LDLC SERPL CALC-MCNC: 182 MG/DL (ref 0–99)
LYMPHOCYTES # BLD AUTO: 1.9 X10E3/UL (ref 0.7–3.1)
LYMPHOCYTES NFR BLD AUTO: 37 %
MCH RBC QN AUTO: 31.6 PG (ref 26.6–33)
MCHC RBC AUTO-ENTMCNC: 33.5 G/DL (ref 31.5–35.7)
MCV RBC AUTO: 94 FL (ref 79–97)
MONOCYTES # BLD AUTO: 0.4 X10E3/UL (ref 0.1–0.9)
MONOCYTES NFR BLD AUTO: 7 %
NEUTROPHILS # BLD AUTO: 2.7 X10E3/UL (ref 1.4–7)
NEUTROPHILS NFR BLD AUTO: 54 %
PLATELET # BLD AUTO: 249 X10E3/UL (ref 150–450)
POTASSIUM SERPL-SCNC: 4.8 MMOL/L (ref 3.5–5.2)
PROT SERPL-MCNC: 7.6 G/DL (ref 6–8.5)
RBC # BLD AUTO: 4.37 X10E6/UL (ref 3.77–5.28)
SODIUM SERPL-SCNC: 140 MMOL/L (ref 134–144)
TRIGL SERPL-MCNC: 179 MG/DL (ref 0–149)
TSH SERPL DL<=0.005 MIU/L-ACNC: 1.38 UIU/ML (ref 0.45–4.5)
VLDLC SERPL CALC-MCNC: 33 MG/DL (ref 5–40)
WBC # BLD AUTO: 5.1 X10E3/UL (ref 3.4–10.8)

## 2022-04-27 ENCOUNTER — OFFICE VISIT (OUTPATIENT)
Dept: FAMILY MEDICINE CLINIC | Facility: CLINIC | Age: 57
End: 2022-04-27

## 2022-04-27 VITALS
HEIGHT: 70 IN | SYSTOLIC BLOOD PRESSURE: 138 MMHG | WEIGHT: 203 LBS | BODY MASS INDEX: 29.06 KG/M2 | DIASTOLIC BLOOD PRESSURE: 83 MMHG | TEMPERATURE: 97.4 F | RESPIRATION RATE: 16 BRPM | OXYGEN SATURATION: 95 % | HEART RATE: 81 BPM

## 2022-04-27 DIAGNOSIS — H53.8 BLURRED VISION, LEFT EYE: ICD-10-CM

## 2022-04-27 DIAGNOSIS — Z00.00 PREVENTATIVE HEALTH CARE: Primary | ICD-10-CM

## 2022-04-27 DIAGNOSIS — R73.03 PREDIABETES: ICD-10-CM

## 2022-04-27 DIAGNOSIS — E78.2 MIXED HYPERLIPIDEMIA: ICD-10-CM

## 2022-04-27 DIAGNOSIS — L97.909 CHRONIC CUTANEOUS VENOUS STASIS ULCER: ICD-10-CM

## 2022-04-27 DIAGNOSIS — Z12.4 SCREENING FOR CERVICAL CANCER: ICD-10-CM

## 2022-04-27 DIAGNOSIS — R06.09 DYSPNEA ON EXERTION: ICD-10-CM

## 2022-04-27 DIAGNOSIS — D68.51 FACTOR V LEIDEN MUTATION: ICD-10-CM

## 2022-04-27 DIAGNOSIS — F51.04 CHRONIC INSOMNIA: ICD-10-CM

## 2022-04-27 DIAGNOSIS — I83.009 CHRONIC CUTANEOUS VENOUS STASIS ULCER: ICD-10-CM

## 2022-04-27 DIAGNOSIS — Z12.11 SCREENING FOR COLON CANCER: ICD-10-CM

## 2022-04-27 DIAGNOSIS — R00.2 PALPITATIONS: ICD-10-CM

## 2022-04-27 DIAGNOSIS — R31.21 ASYMPTOMATIC MICROSCOPIC HEMATURIA: ICD-10-CM

## 2022-04-27 DIAGNOSIS — I82.5Y9 CHRONIC DEEP VEIN THROMBOSIS (DVT) OF PROXIMAL VEIN OF LOWER EXTREMITY, UNSPECIFIED LATERALITY: ICD-10-CM

## 2022-04-27 DIAGNOSIS — M85.89 OSTEOPENIA OF MULTIPLE SITES: ICD-10-CM

## 2022-04-27 DIAGNOSIS — J30.9 ALLERGIC RHINITIS, UNSPECIFIED SEASONALITY, UNSPECIFIED TRIGGER: ICD-10-CM

## 2022-04-27 DIAGNOSIS — Q26.8: ICD-10-CM

## 2022-04-27 DIAGNOSIS — E04.1 THYROID NODULE: ICD-10-CM

## 2022-04-27 DIAGNOSIS — I87.2 CHRONIC VENOUS INSUFFICIENCY: ICD-10-CM

## 2022-04-27 DIAGNOSIS — B97.7 HUMAN PAPILLOMA VIRUS (HPV) INFECTION: ICD-10-CM

## 2022-04-27 PROBLEM — K57.90 DIVERTICULOSIS: Status: ACTIVE | Noted: 2022-04-27

## 2022-04-27 PROBLEM — J45.909 ASTHMA: Status: RESOLVED | Noted: 2022-04-27 | Resolved: 2022-04-27

## 2022-04-27 PROBLEM — J45.909 ASTHMA: Status: ACTIVE | Noted: 2022-04-27

## 2022-04-27 PROBLEM — M25.50 ARTHRALGIA: Status: RESOLVED | Noted: 2021-04-22 | Resolved: 2022-04-27

## 2022-04-27 LAB
BILIRUB BLD-MCNC: NEGATIVE MG/DL
CLARITY, POC: CLEAR
COLOR UR: YELLOW
EXPIRATION DATE: NORMAL
GLUCOSE UR STRIP-MCNC: NEGATIVE MG/DL
KETONES UR QL: NEGATIVE
LEUKOCYTE EST, POC: NEGATIVE
Lab: NORMAL
NITRITE UR-MCNC: NEGATIVE MG/ML
PH UR: 7 [PH] (ref 5–8)
PROT UR STRIP-MCNC: NEGATIVE MG/DL
RBC # UR STRIP: NEGATIVE /UL
SP GR UR: 1.01 (ref 1–1.03)
UROBILINOGEN UR QL: NORMAL

## 2022-04-27 PROCEDURE — 81003 URINALYSIS AUTO W/O SCOPE: CPT | Performed by: NURSE PRACTITIONER

## 2022-04-27 PROCEDURE — 99396 PREV VISIT EST AGE 40-64: CPT | Performed by: NURSE PRACTITIONER

## 2022-04-27 RX ORDER — FLASH GLUCOSE SCANNING READER
1 EACH MISCELLANEOUS
Qty: 6 EACH | Refills: 3 | Status: SHIPPED | OUTPATIENT
Start: 2022-04-27

## 2022-04-27 RX ORDER — TAZAROTENE 0.45 MG/G
1 LOTION TOPICAL NIGHTLY
Qty: 45 G | Refills: 11 | Status: SHIPPED | OUTPATIENT
Start: 2022-04-27

## 2022-04-27 RX ORDER — FLASH GLUCOSE SENSOR
1 KIT MISCELLANEOUS
Qty: 6 EACH | Refills: 11 | Status: SHIPPED | OUTPATIENT
Start: 2022-04-27

## 2022-04-27 RX ORDER — FLASH GLUCOSE SCANNING READER
1 EACH MISCELLANEOUS
Qty: 6 EACH | Refills: 3 | Status: SHIPPED | OUTPATIENT
Start: 2022-04-27 | End: 2022-04-27 | Stop reason: SDUPTHER

## 2022-04-27 NOTE — ASSESSMENT & PLAN NOTE
Subsequent paps and HPV testing have been negative.   She would like to continue annual paps and do HPV testing every 5 years.   Next co-testing is due in 2025.

## 2022-04-27 NOTE — PROGRESS NOTES
Tiarra.Chief Complaint  Annual Exam    Subjective          History of Present Illness  Patient presents for her annual wellness/preventive visit.        Current Outpatient Medications:   •  Continuous Blood Gluc  (FreeStyle Vikas 14 Day [place]) device, 1 Device Every 14 (Fourteen) Days. Dx: R73.03, Disp: 6 each, Rfl: 3  •  Continuous Blood Gluc Sensor (FreeStyle Vikas 14 Day Sensor) misc, Inject 1 Device under the skin into the appropriate area as directed Every 14 (Fourteen) Days., Disp: 6 each, Rfl: 11  •  rivaroxaban (Xarelto) 20 MG tablet, Take 1 tablet by mouth Daily. Take with dinner, Disp: 90 tablet, Rfl: 3  •  Tazarotene (Arazlo) 0.045 % lotion, Apply 1 application topically Every Night., Disp: 45 g, Rfl: 11     Review of Systems   Constitutional: Negative for appetite change, chills, diaphoresis, fatigue, fever, unexpected weight gain and unexpected weight loss.   HENT: Positive for hearing loss. Negative for congestion, ear discharge, ear pain, mouth sores, nosebleeds, postnasal drip, rhinorrhea, sinus pressure, sneezing, sore throat, swollen glands and trouble swallowing.    Eyes: Positive for blurred vision (Left eye - chronic. Followed [facility]). Negative for double vision, pain, discharge, redness and itching.   Respiratory: Positive for shortness of breath (Dyspnea with moderate exertion (hiking) - no worsening). Negative for apnea, cough, choking, wheezing and stridor.    Cardiovascular: Positive for palpitations (mild, no change) and leg swelling (Chronic RLE edema, occasional LLE edema.). Negative for chest pain.   Gastrointestinal: Negative for abdominal distention, abdominal pain, anal bleeding, blood in stool, constipation, diarrhea, nausea, rectal pain, vomiting, GERD and indigestion.   Endocrine: Negative for cold intolerance, heat intolerance, polydipsia, polyphagia and polyuria.        Occasional hot flashes   Genitourinary: Negative for breast discharge, breast lump,  "breast pain, difficulty urinating, dysuria, flank pain, frequency, genital sores, hematuria, pelvic pain, urgency, urinary incontinence, vaginal bleeding, vaginal discharge and vaginal pain.   Musculoskeletal: Positive for arthralgias (Knees, shoulders). Negative for back pain, gait problem, joint swelling, myalgias, neck pain and neck stiffness.   Skin: Negative for color change, rash and skin lesions.   Neurological: Negative for dizziness, tremors, seizures, syncope, speech difficulty, weakness, light-headedness, numbness, headache, memory problem and confusion.   Hematological: Negative for adenopathy. Does not bruise/bleed easily.   Psychiatric/Behavioral: Positive for sleep disturbance (chronic). Negative for self-injury, suicidal ideas, depressed mood and stress. The patient is not nervous/anxious.       Objective   Vital Signs:   Vitals:    04/27/22 0756   BP: 138/83   BP Location: Right arm   Patient Position: Sitting   Cuff Size: Adult   Pulse: 81   Resp: 16   Temp: 97.4 °F (36.3 °C)   TempSrc: Infrared   SpO2: 95%   Weight: 92.1 kg (203 lb)   Height: 177.8 cm (70\")     Body mass index is 29.13 kg/m².      PHQ-2 Depression Screening  Little interest or pleasure in doing things? 0-->not at all   Feeling down, depressed, or hopeless? 0-->not at all   PHQ-2 Total Score 0       Physical Exam  Vitals reviewed.   Constitutional:       General: She is not in acute distress.     Appearance: She is well-developed.   HENT:      Head: Normocephalic and atraumatic.      Right Ear: Tympanic membrane, ear canal and external ear normal. Tympanic membrane is not injected, erythematous, retracted or bulging.      Left Ear: Tympanic membrane, ear canal and external ear normal. Tympanic membrane is not injected, erythematous, retracted or bulging.      Nose: Nose normal. No mucosal edema or rhinorrhea.      Right Sinus: No maxillary sinus tenderness or frontal sinus tenderness.      Left Sinus: No maxillary sinus tenderness " or frontal sinus tenderness.      Mouth/Throat:      Mouth: No oral lesions.      Pharynx: Uvula midline. No oropharyngeal exudate or posterior oropharyngeal erythema.   Eyes:      General: Lids are normal.         Right eye: No discharge.         Left eye: No discharge.      Conjunctiva/sclera: Conjunctivae normal.      Pupils: Pupils are equal, round, and reactive to light.   Neck:      Thyroid: No thyroid mass or thyromegaly.      Vascular: No carotid bruit or JVD.      Trachea: No tracheal deviation.   Cardiovascular:      Rate and Rhythm: Normal rate and regular rhythm.      Heart sounds: Normal heart sounds. No murmur heard.    No friction rub. No gallop.   Pulmonary:      Effort: Pulmonary effort is normal. No respiratory distress.      Breath sounds: Normal breath sounds. No wheezing or rales.   Chest:   Breasts: Breasts are symmetrical.      Right: No inverted nipple, mass, nipple discharge, skin change or tenderness.      Left: No inverted nipple, mass, nipple discharge, skin change or tenderness.       Abdominal:      General: Bowel sounds are normal. There is no distension.      Palpations: Abdomen is soft. There is no mass.      Tenderness: There is no abdominal tenderness.      Hernia: No hernia is present.   Genitourinary:     Exam position: Supine.      Labia:         Right: No rash, tenderness or lesion.         Left: No rash, tenderness or lesion.       Vagina: Normal. No vaginal discharge, erythema or tenderness.      Cervix: No cervical motion tenderness, discharge or friability.      Uterus: Not tender.       Adnexa:         Right: No mass, tenderness or fullness.          Left: No mass, tenderness or fullness.        Rectum: Normal.   Musculoskeletal:         General: No deformity. Normal range of motion.      Cervical back: Normal range of motion and neck supple.   Lymphadenopathy:      Cervical: No cervical adenopathy.      Lower Body: No right inguinal adenopathy. No left inguinal  adenopathy.   Skin:     General: Skin is warm and dry.      Findings: No lesion or rash.   Neurological:      Mental Status: She is alert and oriented to person, place, and time.      Cranial Nerves: No cranial nerve deficit.      Sensory: No sensory deficit.      Gait: Gait normal.      Deep Tendon Reflexes: Reflexes are normal and symmetric.   Psychiatric:         Speech: Speech normal.         Behavior: Behavior normal.         Thought Content: Thought content normal.         Judgment: Judgment normal.          Result Review :   The following data was reviewed by: PATRICIA Santoro on 04/27/2022:  CMP    CMP 4/20/22   Glucose 101 (A)   BUN 14   Creatinine 0.84   Sodium 140   Potassium 4.8   Chloride 100   Calcium 9.8   Total Protein 7.6   Albumin 4.7   Globulin 2.9   Total Bilirubin 0.9   Alkaline Phosphatase 87   AST (SGOT) 23   ALT (SGPT) 17   (A) Abnormal value            Lab Results   Component Value Date    WBC 5.1 04/20/2022    HGB 13.8 04/20/2022    HCT 41.2 04/20/2022    MCV 94 04/20/2022     04/20/2022     Lab Results   Component Value Date    CHOL 198 03/07/2018    CHLPL 279 (H) 04/20/2022    TRIG 179 (H) 04/20/2022    HDL 64 04/20/2022     (H) 04/20/2022     Lab Results   Component Value Date    TSH 1.380 04/20/2022     Lab Results   Component Value Date    HGBA1C 5.8 (H) 04/20/2022       Office Visit on 04/27/2022   Component Date Value Ref Range Status   • Color 04/27/2022 Yellow  Yellow, Straw, Dark Yellow, [place] Final   • Clarity, UA 04/27/2022 Clear  Clear Final   • Specific Gravity  04/27/2022 1.015  1.005 - 1.030 Final   • pH, Urine 04/27/2022 7.0  5.0 - 8.0 Final   • Leukocytes 04/27/2022 Negative  Negative Final   • Nitrite, UA 04/27/2022 Negative  Negative Final   • Protein, POC 04/27/2022 Negative  Negative mg/dL Final   • Glucose, UA 04/27/2022 Negative  Negative, 1000 mg/dL (3+) mg/dL Final   • Ketones, UA 04/27/2022 Negative  Negative Final   • Urobilinogen, UA 04/27/2022  Normal  Normal Final   • Bilirubin 04/27/2022 Negative  Negative Final   • Blood, UA 04/27/2022 Negative  Negative Final   • Lot Number 04/27/2022 [id number]   Final   • Expiration Date 04/27/2022 02/28/23   Final                 Assessment and Plan    Diagnoses and all orders for this visit:    1. Preventative health care (Primary)  Comments:  Discussed age appropriate health maintenance. Anticipatory guidance given.   Orders:  -     POC Urinalysis Dipstick, Automated    2. Palpitations  Assessment & Plan:  This is a chronic issue.    She had a Holter monitor in 2016 that showed a baseline normal sinus rhythm, with a maximum rate of 160.  She had isolated supraventricular ectopic complexes and several PVCs.  She also had echo and stress test in 2020 that were unremarkable.   She discussed this with Dr. Curry last year and no further evaluation was recommended.       3. Dyspnea on exertion  Assessment & Plan:  She has had echo and stress test and cardiology referral.   Thought to be due to deconditioning.  No worsening. She declines further evaluation at this time.       4. Prediabetes  Assessment & Plan:  A1c has improved, but is still in the prediabetes range.   Encouraged her to continue healthy diet and regular exercise.   Recheck in 1 year.     Orders:  -     Discontinue: Continuous Blood Gluc  (FreeStyle Vikas 14 Day [place]) device; 1 Device Every 14 (Fourteen) Days. Dx: R73.03  Dispense: 6 each; Refill: 3  -     Continuous Blood Gluc  (FreeStyle Vikas 14 Day [place]) device; 1 Device Every 14 (Fourteen) Days. Dx: R73.03  Dispense: 6 each; Refill: 3  -     Continuous Blood Gluc Sensor (FreeStyle Vikas 14 Day Sensor) misc; Inject 1 Device under the skin into the appropriate area as directed Every 14 (Fourteen) Days.  Dispense: 6 each; Refill: 11    5. Mixed hyperlipidemia  Assessment & Plan:  Encouraged healthy diet, regular exercise.   Repeat in 1 year.     The 10-year ASCVD risk score (Omapreethi OCHOA Jr.,  et al., 2013) is: 3.7%    Values used to calculate the score:      Age: 56 years      Sex: Female      Is Non- : No      Diabetic: No      Tobacco smoker: No      Systolic Blood Pressure: 138 mmHg      Is BP treated: No      HDL Cholesterol: 64 mg/dL      Total Cholesterol: 279 mg/dL        6. Abnormal inferior vena caval connection  Assessment & Plan:  Due to chronic DVT. Has been evaluated by vascular surgeon in the past. No further intervention recommended.       7. Chronic deep vein thrombosis (DVT) of proximal vein of lower extremity, unspecified laterality (HCC)  Assessment & Plan:  On chronic anticoagulation.   Past evaluation by Dr. Barclay.       8. Chronic venous insufficiency  Assessment & Plan:  Continue use of compression stockings and leg elevation.       9. Chronic cutaneous venous stasis ulcer (HCC)  Assessment & Plan:  Currently healed/closed.   Continue compression stockings and topical treatments as needed.       10. Factor V Leiden mutation (HCC)  Assessment & Plan:  Continue chronic anticoagulation.       11. Human papilloma virus (HPV) infection  Assessment & Plan:  Subsequent paps and HPV testing have been negative.   She would like to continue annual paps and do HPV testing every 5 years.   Next co-testing is due in 2025.      12. Osteopenia of multiple sites  Assessment & Plan:  Continue weight bearing exercise and increased calcium intake.   Will call with DEXA results and further recommendations.       13. Thyroid nodule  Assessment & Plan:  Benign-appearing cystic nodules on 2011 ultrasound.      14. Blurred vision, left eye  Assessment & Plan:  Followed by [facility].       15. Asymptomatic microscopic hematuria  Assessment & Plan:  Past evaluation by Urology.   Not noted on today's UA.       16. Chronic insomnia  Assessment & Plan:  She has tried Elavil and Trazodone in the past - they didn't help the insomnia and made her feel groggy in the morning.    She declines trial of another medication and sleep study.       17. Allergic rhinitis, unspecified seasonality, unspecified trigger  Assessment & Plan:  Mild. Controlled with PRN use of oral antihistamine.       18. Screening for colon cancer  Comments:  Next colonoscopy due in 2026.    19. Screening for cervical cancer  Comments:  Negative pap alone in 2021.  Negative co-testing in 2020. Repeat HPV testing in 2025.   Orders:  -     PAP, Liquid Based ([facility] Only)    Other orders  -     Tazarotene (Arazlo) 0.045 % lotion; Apply 1 application topically Every Night.  Dispense: 45 g; Refill: 11  -     rivaroxaban (Xarelto) 20 MG tablet; Take 1 tablet by mouth Daily. Take with dinner  Dispense: 90 tablet; Refill: 3          Follow Up   No follow-ups on file.    Patient was given instructions and counseling regarding her condition and health maintenance advice. Please see specific information in the After Visit Summary.     PATRICIA Santoro 4/27/2022 08:57 EDT  This note was electronically signed.

## 2022-04-27 NOTE — ASSESSMENT & PLAN NOTE
Encouraged healthy diet, regular exercise.   Repeat in 1 year.     The 10-year ASCVD risk score (Oma OCHOA Jr., et al., 2013) is: 3.7%    Values used to calculate the score:      Age: 56 years      Sex: Female      Is Non- : No      Diabetic: No      Tobacco smoker: No      Systolic Blood Pressure: 138 mmHg      Is BP treated: No      HDL Cholesterol: 64 mg/dL      Total Cholesterol: 279 mg/dL

## 2022-04-27 NOTE — ASSESSMENT & PLAN NOTE
Continue weight bearing exercise and increased calcium intake.   Will call with DEXA results and further recommendations.

## 2022-04-27 NOTE — ASSESSMENT & PLAN NOTE
She has tried Elavil and Trazodone in the past - they didn't help the insomnia and made her feel groggy in the morning.   She declines trial of another medication and sleep study.

## 2022-04-27 NOTE — ASSESSMENT & PLAN NOTE
Due to chronic DVT. Has been evaluated by vascular surgeon in the past. No further intervention recommended.

## 2022-04-27 NOTE — ASSESSMENT & PLAN NOTE
A1c has improved, but is still in the prediabetes range.   Encouraged her to continue healthy diet and regular exercise.   Recheck in 1 year.

## 2022-04-27 NOTE — ASSESSMENT & PLAN NOTE
This is a chronic issue.    She had a Holter monitor in 2016 that showed a baseline normal sinus rhythm, with a maximum rate of 160.  She had isolated supraventricular ectopic complexes and several PVCs.  She also had echo and stress test in 2020 that were unremarkable.   She discussed this with Dr. Curry last year and no further evaluation was recommended.

## 2022-04-27 NOTE — ASSESSMENT & PLAN NOTE
She has had echo and stress test and cardiology referral.   Thought to be due to deconditioning.  No worsening. She declines further evaluation at this time.

## 2022-05-18 ENCOUNTER — HOSPITAL ENCOUNTER (OUTPATIENT)
Dept: MAMMOGRAPHY | Facility: HOSPITAL | Age: 57
Discharge: HOME | End: 2022-05-18

## 2022-05-18 ENCOUNTER — HOSPITAL ENCOUNTER (OUTPATIENT)
Dept: BONE DENSITY | Facility: HOSPITAL | Age: 57
Discharge: HOME | End: 2022-05-18

## 2022-05-18 DIAGNOSIS — Z12.31 BREAST CANCER SCREENING BY MAMMOGRAM: ICD-10-CM

## 2022-05-18 DIAGNOSIS — M85.89 OSTEOPENIA OF MULTIPLE SITES: ICD-10-CM

## 2022-05-18 PROCEDURE — 77067 SCR MAMMO BI INCL CAD: CPT

## 2022-05-18 PROCEDURE — 77063 BREAST TOMOSYNTHESIS BI: CPT

## 2022-05-18 PROCEDURE — 77080 DXA BONE DENSITY AXIAL: CPT
